# Patient Record
Sex: MALE | Race: WHITE | Employment: FULL TIME | URBAN - METROPOLITAN AREA
[De-identification: names, ages, dates, MRNs, and addresses within clinical notes are randomized per-mention and may not be internally consistent; named-entity substitution may affect disease eponyms.]

---

## 2017-04-13 ENCOUNTER — TELEPHONE (OUTPATIENT)
Dept: OTOLARYNGOLOGY | Facility: CLINIC | Age: 22
End: 2017-04-13

## 2017-04-13 ENCOUNTER — OFFICE VISIT (OUTPATIENT)
Dept: OTOLARYNGOLOGY | Facility: CLINIC | Age: 22
End: 2017-04-13

## 2017-04-13 VITALS
SYSTOLIC BLOOD PRESSURE: 124 MMHG | HEIGHT: 65 IN | DIASTOLIC BLOOD PRESSURE: 71 MMHG | TEMPERATURE: 98 F | BODY MASS INDEX: 51.82 KG/M2 | WEIGHT: 311 LBS

## 2017-04-13 DIAGNOSIS — H60.392 OTHER INFECTIVE ACUTE OTITIS EXTERNA OF LEFT EAR: Primary | ICD-10-CM

## 2017-04-13 PROCEDURE — 99212 OFFICE O/P EST SF 10 MIN: CPT | Performed by: OTOLARYNGOLOGY

## 2017-04-13 PROCEDURE — 92504 EAR MICROSCOPY EXAMINATION: CPT | Performed by: OTOLARYNGOLOGY

## 2017-04-13 PROCEDURE — 99214 OFFICE O/P EST MOD 30 MIN: CPT | Performed by: OTOLARYNGOLOGY

## 2017-04-13 RX ORDER — AMOXICILLIN 500 MG/1
1 TABLET, FILM COATED ORAL
COMMUNITY
Start: 2017-04-12 | End: 2017-04-22

## 2017-04-13 RX ORDER — NEOMYCIN SULFATE, POLYMYXIN B SULFATE, HYDROCORTISONE 3.5; 10000; 1 MG/ML; [USP'U]/ML; MG/ML
4 SOLUTION/ DROPS AURICULAR (OTIC)
COMMUNITY
Start: 2017-04-12 | End: 2021-03-29

## 2017-04-13 RX ORDER — CIPROFLOXACIN 500 MG/1
500 TABLET, FILM COATED ORAL EVERY 12 HOURS
Qty: 14 TABLET | Refills: 0 | Status: SHIPPED | OUTPATIENT
Start: 2017-04-13 | End: 2021-03-29

## 2017-04-13 RX ORDER — OMEPRAZOLE 40 MG/1
40 CAPSULE, DELAYED RELEASE ORAL DAILY
COMMUNITY
Start: 2012-11-26

## 2017-04-13 RX ORDER — OFLOXACIN 3 MG/ML
3 SOLUTION/ DROPS OPHTHALMIC 3 TIMES DAILY
Qty: 1 BOTTLE | Refills: 0 | Status: SHIPPED | OUTPATIENT
Start: 2017-04-13 | End: 2017-04-20

## 2017-04-13 NOTE — PROGRESS NOTES
Heather Colon is a 24year old male.   Patient presents with:  Ear Problem: pain and yellow brown drainage from the left ear since yesterday, pt is currently on antibiotics and ear drops       HISTORY OF PRESENT ILLNESS    He has had tubes at least LASER-ASSISTED           REVIEW OF SYSTEMS    System Neg/Pos Details   Constitutional Negative Fatigue, fever and weight loss. ENMT Negative Drooling. Eyes Negative Blurred vision and vision changes. Respiratory Negative Dyspnea and wheezing.    Eligio Trevino Normal. Oropharynx - Normal.   Nose/Mouth/Throat Normal Nares - Right: Normal Left: Normal. Septum -Normal  Turbinates - Right: Normal, Left: Normal.   Microscopy  Binocular microscopy was performed.  The affected ear(s) was/were examined and all debris rem

## 2017-04-13 NOTE — TELEPHONE ENCOUNTER
Pt requesting medication clarification on:  ofloxacin 0.3 % Ophthalmic Solution. Pt was seen in office today and was given ear drops but rx states that they are eye drops? Pt asking if it is ok to use drops in ears?  Also has questions re:   Cipro, if th

## 2017-04-13 NOTE — TELEPHONE ENCOUNTER
Pt informed he may use the Ofloxacin 0.3% ophthalmic sol'n in his ears; it is a secondary use of the medication. Regarding Ciprofloxacin, dizziness may be a side effect but is hard to say if he will experience it.   Advised pt to take medication with food;

## 2017-11-09 ENCOUNTER — OFFICE VISIT (OUTPATIENT)
Dept: OTOLARYNGOLOGY | Facility: CLINIC | Age: 22
End: 2017-11-09

## 2017-11-09 VITALS
SYSTOLIC BLOOD PRESSURE: 128 MMHG | DIASTOLIC BLOOD PRESSURE: 64 MMHG | BODY MASS INDEX: 53.78 KG/M2 | HEIGHT: 64 IN | WEIGHT: 315 LBS | TEMPERATURE: 98 F

## 2017-11-09 DIAGNOSIS — H61.23 BILATERAL IMPACTED CERUMEN: Primary | ICD-10-CM

## 2017-11-09 PROCEDURE — 69210 REMOVE IMPACTED EAR WAX UNI: CPT | Performed by: OTOLARYNGOLOGY

## 2020-04-21 ENCOUNTER — TELEPHONE (OUTPATIENT)
Dept: OTOLARYNGOLOGY | Facility: CLINIC | Age: 25
End: 2020-04-21

## 2020-04-21 NOTE — TELEPHONE ENCOUNTER
Teri Hemphill, pt last office visit on 11/09/17 ear wax , states he has tube in left ear. Pt states on saturday when showering, he felt water go in his left ear, and Sunday ear started draining (white/ green color drainage ), per pt no pain, no itching. OK to schedule e visit or should pt be seen in office on Friday, did get consent for e visit from pt.  Please advise

## 2020-04-21 NOTE — TELEPHONE ENCOUNTER
Per pt asking for cipro drops refill, states he has ear problem, states there is no fever but has shower water in ear where tube is. Please advise thank you.

## 2020-04-22 ENCOUNTER — TELEPHONE (OUTPATIENT)
Dept: OTOLARYNGOLOGY | Facility: CLINIC | Age: 25
End: 2020-04-22

## 2020-04-22 DIAGNOSIS — H92.12 OTORRHEA OF LEFT EAR: Primary | ICD-10-CM

## 2020-04-22 PROCEDURE — 99213 OFFICE O/P EST LOW 20 MIN: CPT | Performed by: OTOLARYNGOLOGY

## 2020-04-22 RX ORDER — OFLOXACIN 3 MG/ML
3 SOLUTION AURICULAR (OTIC) 3 TIMES DAILY
Qty: 1 BOTTLE | Refills: 0 | Status: SHIPPED | OUTPATIENT
Start: 2020-04-22 | End: 2020-04-29

## 2020-04-22 RX ORDER — CIPROFLOXACIN 500 MG/1
500 TABLET, FILM COATED ORAL EVERY 12 HOURS
Qty: 14 TABLET | Refills: 0 | Status: SHIPPED | OUTPATIENT
Start: 2020-04-22 | End: 2021-03-29

## 2020-04-22 NOTE — TELEPHONE ENCOUNTER
Virtual Telephone Check-In    Marc Addison verbally consents to a Virtual/Telephone Check-In visit on 04/22/20.     Patient understands and accepts financial responsibility for any deductible, co-insurance and/or co-pays associated with this servic left ear in a patient with a history of tubes only on the left side. I suspect otitis externa from water getting into his ear. We did discuss strict water precautions.   He does understand that there are limitations to this telephone encounter and that he

## 2021-03-30 ENCOUNTER — LAB ENCOUNTER (OUTPATIENT)
Dept: LAB | Facility: HOSPITAL | Age: 26
End: 2021-03-30
Attending: ORTHOPAEDIC SURGERY
Payer: OTHER MISCELLANEOUS

## 2021-03-30 DIAGNOSIS — Z01.818 PRE-OP TESTING: ICD-10-CM

## 2021-04-01 ENCOUNTER — HOSPITAL ENCOUNTER (OUTPATIENT)
Facility: HOSPITAL | Age: 26
Setting detail: HOSPITAL OUTPATIENT SURGERY
Discharge: HOME | End: 2021-04-01
Attending: ORTHOPAEDIC SURGERY | Admitting: ORTHOPAEDIC SURGERY
Payer: OTHER MISCELLANEOUS

## 2021-04-01 ENCOUNTER — ANESTHESIA (OUTPATIENT)
Dept: SURGERY | Facility: HOSPITAL | Age: 26
End: 2021-04-01
Payer: OTHER MISCELLANEOUS

## 2021-04-01 ENCOUNTER — ANESTHESIA EVENT (OUTPATIENT)
Dept: SURGERY | Facility: HOSPITAL | Age: 26
End: 2021-04-01
Payer: OTHER MISCELLANEOUS

## 2021-04-01 VITALS
HEART RATE: 77 BPM | SYSTOLIC BLOOD PRESSURE: 133 MMHG | TEMPERATURE: 98 F | RESPIRATION RATE: 16 BRPM | DIASTOLIC BLOOD PRESSURE: 85 MMHG | WEIGHT: 315 LBS | OXYGEN SATURATION: 94 % | BODY MASS INDEX: 52.48 KG/M2 | HEIGHT: 65 IN

## 2021-04-01 DIAGNOSIS — Z01.818 PRE-OP TESTING: Primary | ICD-10-CM

## 2021-04-01 PROCEDURE — 3E0T3BZ INTRODUCTION OF ANESTHETIC AGENT INTO PERIPHERAL NERVES AND PLEXI, PERCUTANEOUS APPROACH: ICD-10-PCS | Performed by: STUDENT IN AN ORGANIZED HEALTH CARE EDUCATION/TRAINING PROGRAM

## 2021-04-01 PROCEDURE — 01N40ZZ RELEASE ULNAR NERVE, OPEN APPROACH: ICD-10-PCS | Performed by: ORTHOPAEDIC SURGERY

## 2021-04-01 PROCEDURE — 3E0T33Z INTRODUCTION OF ANTI-INFLAMMATORY INTO PERIPHERAL NERVES AND PLEXI, PERCUTANEOUS APPROACH: ICD-10-PCS | Performed by: STUDENT IN AN ORGANIZED HEALTH CARE EDUCATION/TRAINING PROGRAM

## 2021-04-01 PROCEDURE — 76942 ECHO GUIDE FOR BIOPSY: CPT | Performed by: STUDENT IN AN ORGANIZED HEALTH CARE EDUCATION/TRAINING PROGRAM

## 2021-04-01 PROCEDURE — 64415 NJX AA&/STRD BRCH PLXS IMG: CPT | Performed by: ORTHOPAEDIC SURGERY

## 2021-04-01 PROCEDURE — 76942 ECHO GUIDE FOR BIOPSY: CPT | Performed by: ORTHOPAEDIC SURGERY

## 2021-04-01 RX ORDER — ACETAMINOPHEN 325 MG/1
650 TABLET ORAL EVERY 4 HOURS PRN
Status: CANCELLED | OUTPATIENT
Start: 2021-04-01

## 2021-04-01 RX ORDER — ONDANSETRON 2 MG/ML
4 INJECTION INTRAMUSCULAR; INTRAVENOUS EVERY 6 HOURS PRN
Status: CANCELLED | OUTPATIENT
Start: 2021-04-01

## 2021-04-01 RX ORDER — ROPIVACAINE HYDROCHLORIDE 5 MG/ML
INJECTION, SOLUTION EPIDURAL; INFILTRATION; PERINEURAL AS NEEDED
Status: DISCONTINUED | OUTPATIENT
Start: 2021-04-01 | End: 2021-04-01 | Stop reason: SURG

## 2021-04-01 RX ORDER — NALOXONE HYDROCHLORIDE 0.4 MG/ML
80 INJECTION, SOLUTION INTRAMUSCULAR; INTRAVENOUS; SUBCUTANEOUS AS NEEDED
Status: DISCONTINUED | OUTPATIENT
Start: 2021-04-01 | End: 2021-04-01

## 2021-04-01 RX ORDER — HALOPERIDOL 5 MG/ML
0.25 INJECTION INTRAMUSCULAR ONCE AS NEEDED
Status: CANCELLED | OUTPATIENT
Start: 2021-04-01 | End: 2021-04-01

## 2021-04-01 RX ORDER — HYDROMORPHONE HYDROCHLORIDE 1 MG/ML
0.4 INJECTION, SOLUTION INTRAMUSCULAR; INTRAVENOUS; SUBCUTANEOUS EVERY 5 MIN PRN
Status: CANCELLED | OUTPATIENT
Start: 2021-04-01

## 2021-04-01 RX ORDER — NALOXONE HYDROCHLORIDE 0.4 MG/ML
80 INJECTION, SOLUTION INTRAMUSCULAR; INTRAVENOUS; SUBCUTANEOUS AS NEEDED
Status: CANCELLED | OUTPATIENT
Start: 2021-04-01 | End: 2021-04-01

## 2021-04-01 RX ORDER — MORPHINE SULFATE 2 MG/ML
2 INJECTION, SOLUTION INTRAMUSCULAR; INTRAVENOUS EVERY 10 MIN PRN
Status: CANCELLED | OUTPATIENT
Start: 2021-04-01

## 2021-04-01 RX ORDER — PROCHLORPERAZINE EDISYLATE 5 MG/ML
5 INJECTION INTRAMUSCULAR; INTRAVENOUS ONCE AS NEEDED
Status: DISCONTINUED | OUTPATIENT
Start: 2021-04-01 | End: 2021-04-01

## 2021-04-01 RX ORDER — HYDROCODONE BITARTRATE AND ACETAMINOPHEN 5; 325 MG/1; MG/1
1 TABLET ORAL AS NEEDED
Status: CANCELLED | OUTPATIENT
Start: 2021-04-01

## 2021-04-01 RX ORDER — METOCLOPRAMIDE 10 MG/1
10 TABLET ORAL ONCE
Status: COMPLETED | OUTPATIENT
Start: 2021-04-01 | End: 2021-04-01

## 2021-04-01 RX ORDER — HYDROCODONE BITARTRATE AND ACETAMINOPHEN 5; 325 MG/1; MG/1
1 TABLET ORAL AS NEEDED
Status: DISCONTINUED | OUTPATIENT
Start: 2021-04-01 | End: 2021-04-01

## 2021-04-01 RX ORDER — MORPHINE SULFATE 4 MG/ML
4 INJECTION, SOLUTION INTRAMUSCULAR; INTRAVENOUS EVERY 10 MIN PRN
Status: CANCELLED | OUTPATIENT
Start: 2021-04-01

## 2021-04-01 RX ORDER — ONDANSETRON 2 MG/ML
4 INJECTION INTRAMUSCULAR; INTRAVENOUS ONCE AS NEEDED
Status: CANCELLED | OUTPATIENT
Start: 2021-04-01 | End: 2021-04-01

## 2021-04-01 RX ORDER — MIDAZOLAM HYDROCHLORIDE 1 MG/ML
INJECTION INTRAMUSCULAR; INTRAVENOUS AS NEEDED
Status: DISCONTINUED | OUTPATIENT
Start: 2021-04-01 | End: 2021-04-01 | Stop reason: SURG

## 2021-04-01 RX ORDER — SODIUM CHLORIDE, SODIUM LACTATE, POTASSIUM CHLORIDE, CALCIUM CHLORIDE 600; 310; 30; 20 MG/100ML; MG/100ML; MG/100ML; MG/100ML
INJECTION, SOLUTION INTRAVENOUS CONTINUOUS
Status: DISCONTINUED | OUTPATIENT
Start: 2021-04-01 | End: 2021-04-01

## 2021-04-01 RX ORDER — HYDROMORPHONE HYDROCHLORIDE 1 MG/ML
0.4 INJECTION, SOLUTION INTRAMUSCULAR; INTRAVENOUS; SUBCUTANEOUS EVERY 5 MIN PRN
Status: DISCONTINUED | OUTPATIENT
Start: 2021-04-01 | End: 2021-04-01

## 2021-04-01 RX ORDER — HYDROMORPHONE HYDROCHLORIDE 1 MG/ML
0.6 INJECTION, SOLUTION INTRAMUSCULAR; INTRAVENOUS; SUBCUTANEOUS EVERY 5 MIN PRN
Status: DISCONTINUED | OUTPATIENT
Start: 2021-04-01 | End: 2021-04-01

## 2021-04-01 RX ORDER — HALOPERIDOL 5 MG/ML
0.25 INJECTION INTRAMUSCULAR ONCE AS NEEDED
Status: DISCONTINUED | OUTPATIENT
Start: 2021-04-01 | End: 2021-04-01

## 2021-04-01 RX ORDER — MORPHINE SULFATE 10 MG/ML
6 INJECTION, SOLUTION INTRAMUSCULAR; INTRAVENOUS EVERY 10 MIN PRN
Status: CANCELLED | OUTPATIENT
Start: 2021-04-01

## 2021-04-01 RX ORDER — HYDROCODONE BITARTRATE AND ACETAMINOPHEN 5; 325 MG/1; MG/1
2 TABLET ORAL EVERY 4 HOURS PRN
Status: CANCELLED | OUTPATIENT
Start: 2021-04-01

## 2021-04-01 RX ORDER — ONDANSETRON 2 MG/ML
4 INJECTION INTRAMUSCULAR; INTRAVENOUS ONCE AS NEEDED
Status: DISCONTINUED | OUTPATIENT
Start: 2021-04-01 | End: 2021-04-01

## 2021-04-01 RX ORDER — MORPHINE SULFATE 10 MG/ML
6 INJECTION, SOLUTION INTRAMUSCULAR; INTRAVENOUS EVERY 10 MIN PRN
Status: DISCONTINUED | OUTPATIENT
Start: 2021-04-01 | End: 2021-04-01

## 2021-04-01 RX ORDER — MORPHINE SULFATE 4 MG/ML
2 INJECTION, SOLUTION INTRAMUSCULAR; INTRAVENOUS EVERY 10 MIN PRN
Status: DISCONTINUED | OUTPATIENT
Start: 2021-04-01 | End: 2021-04-01

## 2021-04-01 RX ORDER — FAMOTIDINE 20 MG/1
20 TABLET ORAL ONCE
Status: COMPLETED | OUTPATIENT
Start: 2021-04-01 | End: 2021-04-01

## 2021-04-01 RX ORDER — HYDROMORPHONE HYDROCHLORIDE 1 MG/ML
0.6 INJECTION, SOLUTION INTRAMUSCULAR; INTRAVENOUS; SUBCUTANEOUS EVERY 5 MIN PRN
Status: CANCELLED | OUTPATIENT
Start: 2021-04-01

## 2021-04-01 RX ORDER — HYDROCODONE BITARTRATE AND ACETAMINOPHEN 5; 325 MG/1; MG/1
2 TABLET ORAL AS NEEDED
Status: CANCELLED | OUTPATIENT
Start: 2021-04-01

## 2021-04-01 RX ORDER — SODIUM CHLORIDE, SODIUM LACTATE, POTASSIUM CHLORIDE, CALCIUM CHLORIDE 600; 310; 30; 20 MG/100ML; MG/100ML; MG/100ML; MG/100ML
INJECTION, SOLUTION INTRAVENOUS CONTINUOUS
Status: CANCELLED | OUTPATIENT
Start: 2021-04-01

## 2021-04-01 RX ORDER — MORPHINE SULFATE 4 MG/ML
4 INJECTION, SOLUTION INTRAMUSCULAR; INTRAVENOUS EVERY 10 MIN PRN
Status: DISCONTINUED | OUTPATIENT
Start: 2021-04-01 | End: 2021-04-01

## 2021-04-01 RX ORDER — PROCHLORPERAZINE EDISYLATE 5 MG/ML
5 INJECTION INTRAMUSCULAR; INTRAVENOUS ONCE AS NEEDED
Status: CANCELLED | OUTPATIENT
Start: 2021-04-01 | End: 2021-04-01

## 2021-04-01 RX ORDER — ACETAMINOPHEN 500 MG
1000 TABLET ORAL ONCE
Status: COMPLETED | OUTPATIENT
Start: 2021-04-01 | End: 2021-04-01

## 2021-04-01 RX ORDER — HYDROMORPHONE HYDROCHLORIDE 1 MG/ML
0.2 INJECTION, SOLUTION INTRAMUSCULAR; INTRAVENOUS; SUBCUTANEOUS EVERY 5 MIN PRN
Status: CANCELLED | OUTPATIENT
Start: 2021-04-01

## 2021-04-01 RX ORDER — HYDROMORPHONE HYDROCHLORIDE 1 MG/ML
0.2 INJECTION, SOLUTION INTRAMUSCULAR; INTRAVENOUS; SUBCUTANEOUS EVERY 5 MIN PRN
Status: DISCONTINUED | OUTPATIENT
Start: 2021-04-01 | End: 2021-04-01

## 2021-04-01 RX ORDER — HYDROCODONE BITARTRATE AND ACETAMINOPHEN 5; 325 MG/1; MG/1
2 TABLET ORAL AS NEEDED
Status: DISCONTINUED | OUTPATIENT
Start: 2021-04-01 | End: 2021-04-01

## 2021-04-01 RX ORDER — DEXAMETHASONE SODIUM PHOSPHATE 10 MG/ML
INJECTION, SOLUTION INTRAMUSCULAR; INTRAVENOUS AS NEEDED
Status: DISCONTINUED | OUTPATIENT
Start: 2021-04-01 | End: 2021-04-01 | Stop reason: SURG

## 2021-04-01 RX ORDER — HYDROCODONE BITARTRATE AND ACETAMINOPHEN 5; 325 MG/1; MG/1
1 TABLET ORAL EVERY 4 HOURS PRN
Status: CANCELLED | OUTPATIENT
Start: 2021-04-01

## 2021-04-01 RX ADMIN — MIDAZOLAM HYDROCHLORIDE 2 MG: 1 INJECTION INTRAMUSCULAR; INTRAVENOUS at 07:20:00

## 2021-04-01 RX ADMIN — SODIUM CHLORIDE, SODIUM LACTATE, POTASSIUM CHLORIDE, CALCIUM CHLORIDE: 600; 310; 30; 20 INJECTION, SOLUTION INTRAVENOUS at 07:48:00

## 2021-04-01 RX ADMIN — SODIUM CHLORIDE, SODIUM LACTATE, POTASSIUM CHLORIDE, CALCIUM CHLORIDE: 600; 310; 30; 20 INJECTION, SOLUTION INTRAVENOUS at 08:20:00

## 2021-04-01 RX ADMIN — ROPIVACAINE HYDROCHLORIDE 30 ML: 5 INJECTION, SOLUTION EPIDURAL; INFILTRATION; PERINEURAL at 07:28:00

## 2021-04-01 RX ADMIN — DEXAMETHASONE SODIUM PHOSPHATE 4 MG: 10 INJECTION, SOLUTION INTRAMUSCULAR; INTRAVENOUS at 07:28:00

## 2021-04-01 NOTE — ANESTHESIA POSTPROCEDURE EVALUATION
Patient: Jessenia Handy.     Procedure Summary     Date: 04/01/21 Room / Location: [address] [place] [address] MAIN OR 16 / [address] [place] [address] MAIN OR    Anesthesia Start: 0079 Anesthesia Stop: 2309    Procedure: right cubital tunnel release (Right Elbow) Diagnosis: (right elbow ulna

## 2021-04-01 NOTE — BRIEF OP NOTE
Pre-Operative Diagnosis: right elbow ulnar nerve compression     Post-Operative Diagnosis: right elbow ulnar nerve compression      Procedure Performed:   right cubital tunnel release    Surgeon(s) and Role:     Billy Puga MD - Primary    Assistant

## 2021-04-01 NOTE — H&P
[facility]    History and Physical    Brady Briceño.  Patient Status:  Hospital Outpatient Surgery    1995 MRN [id number]   Location [address] [facility] Attending Jackie Singleton MD   Hosp Day # 0 PCP Donnie Brambila rate.    Pulmonary/Chest: Effort normal.         Results:   No results found for: WBC, HGB, HCT, PLT, CREATSERUM, BUN, NA, K, CL, CO2, GLU, CA, ALB, ALKPHO, BILT, TP, AST, ALT, PTT, INR, PT, T4F, TSH, TSHREFLEX, XAVI, LIP, GGT, PSA, DDIMER, ESRML, ESRPF, CR

## 2021-04-01 NOTE — ANESTHESIA PREPROCEDURE EVALUATION
Anesthesia PreOp Note    HPI:     Subha Dose. is a 31year old male who presents for preoperative consultation requested by: Blossom Lau MD    Date of Surgery: 4/1/2021    Procedure(s):  right cubital tunnel release  Indication: right tobacco: Never Used      Tobacco comment: quit over a year ago     Vaping Use      Vaping Use: Never used    Substance and Sexual Activity      Alcohol use: Not Currently      Drug use: No      Sexual activity: Not on file    Other Topics      Concerns: saturation is 97%.    03/29/21  1616 04/01/21  0616   BP:  128/79   Pulse:  80   Resp:  18   Temp:  98.1 °F (36.7 °C)   TempSrc:  Oral   SpO2:  97%   Weight: (!) 152.9 kg (337 lb) (!) 154.7 kg (341 lb)   Height: 1.651 m (5' 5\") 1.651 m (5' 5\")        Ane

## 2021-04-01 NOTE — ANESTHESIA PROCEDURE NOTES
Peripheral Block  Performed by: No Hay MD  Authorized by: No Hay MD       General Information and Staff    Start Time:  4/1/2021 7:20 AM  End Time:  4/1/2021 7:28 AM  Anesthesiologist:  No Hay MD  Performed by:   Anesthesiologist

## 2021-04-14 NOTE — OPERATIVE REPORT
Pre-Operative Diagnosis: right elbow ulnar nerve compression     Post-Operative Diagnosis: right elbow ulnar nerve compression      Procedure Performed:   right cubital tunnel release     Surgeon(s) and Role:     Shani Ruth MD - Primary     Assista of the elbow centered on just posterior of epicondyle, taken through skin and subcutaneous tissue with cauterization of all crossing vessels.  Blunt dissection was utilized to identify the sensory nerve branches, which were dissected, retracted, and protect

## 2021-06-15 ENCOUNTER — OFFICE VISIT (OUTPATIENT)
Dept: OTOLARYNGOLOGY | Facility: CLINIC | Age: 26
End: 2021-06-15
Payer: COMMERCIAL

## 2021-06-15 VITALS
WEIGHT: 315 LBS | DIASTOLIC BLOOD PRESSURE: 76 MMHG | SYSTOLIC BLOOD PRESSURE: 146 MMHG | HEIGHT: 64 IN | TEMPERATURE: 99 F | BODY MASS INDEX: 53.78 KG/M2

## 2021-06-15 DIAGNOSIS — H60.392 OTHER INFECTIVE ACUTE OTITIS EXTERNA OF LEFT EAR: Primary | ICD-10-CM

## 2021-06-15 PROCEDURE — 99203 OFFICE O/P NEW LOW 30 MIN: CPT | Performed by: OTOLARYNGOLOGY

## 2021-06-15 PROCEDURE — 3008F BODY MASS INDEX DOCD: CPT | Performed by: OTOLARYNGOLOGY

## 2021-06-15 PROCEDURE — 3077F SYST BP >= 140 MM HG: CPT | Performed by: OTOLARYNGOLOGY

## 2021-06-15 PROCEDURE — 92504 EAR MICROSCOPY EXAMINATION: CPT | Performed by: OTOLARYNGOLOGY

## 2021-06-15 PROCEDURE — 3078F DIAST BP <80 MM HG: CPT | Performed by: OTOLARYNGOLOGY

## 2021-06-15 RX ORDER — IBUPROFEN 200 MG
600 TABLET ORAL EVERY 6 HOURS PRN
COMMUNITY

## 2021-06-15 RX ORDER — OFLOXACIN 3 MG/ML
3 SOLUTION AURICULAR (OTIC) 3 TIMES DAILY
Qty: 10 ML | Refills: 0 | Status: SHIPPED | OUTPATIENT
Start: 2021-06-15 | End: 2021-12-02

## 2021-06-15 RX ORDER — HYDROCODONE BITARTRATE AND ACETAMINOPHEN 5; 325 MG/1; MG/1
TABLET ORAL
COMMUNITY
Start: 2021-04-01 | End: 2021-07-28

## 2021-06-15 NOTE — PROGRESS NOTES
Alina Ingram. is a 31year old male. Patient presents with:  Ear Problem: left ear infection for a couple of weeks       HISTORY OF PRESENT ILLNESS  Previous tube placement about 8 years ago. Only on the left side.   Doing well up until recently Negative Drooling. Eyes Negative Blurred vision and vision changes. Respiratory Negative Dyspnea and wheezing. Cardio Negative Chest pain, irregular heartbeat/palpitations and syncope. GI Negative Abdominal pain and diarrhea.    Endocrine Negative C -Normal  Turbinates - Right: Normal, Left: Normal.   Microscopy  Binocular microscopy was performed. The affected ear(s) was/were examined and all debris removed using suction.  The findings are described in the physical exam.   Left ear cleaned of all debr

## 2021-06-18 ENCOUNTER — TELEPHONE (OUTPATIENT)
Dept: OTOLARYNGOLOGY | Facility: CLINIC | Age: 26
End: 2021-06-18

## 2021-06-18 NOTE — TELEPHONE ENCOUNTER
Mother of pt calling states sent medication to wrong pharmacy need to be sent [facility] on [address]  [address] , [facility] [phone] please advise       ofloxacin 0.3 % Otic Solution 10 mL 0 6/15/2021    Sig:   Place 3 drops into t

## 2021-07-28 ENCOUNTER — OFFICE VISIT (OUTPATIENT)
Dept: NEUROLOGY | Facility: CLINIC | Age: 26
End: 2021-07-28
Payer: COMMERCIAL

## 2021-07-28 ENCOUNTER — TELEPHONE (OUTPATIENT)
Dept: NEUROLOGY | Facility: CLINIC | Age: 26
End: 2021-07-28

## 2021-07-28 VITALS
HEART RATE: 80 BPM | SYSTOLIC BLOOD PRESSURE: 142 MMHG | DIASTOLIC BLOOD PRESSURE: 78 MMHG | HEIGHT: 64 IN | WEIGHT: 315 LBS | BODY MASS INDEX: 53.78 KG/M2

## 2021-07-28 DIAGNOSIS — H02.401 PTOSIS OF RIGHT EYELID: ICD-10-CM

## 2021-07-28 DIAGNOSIS — M79.601 RIGHT ARM PAIN: Primary | ICD-10-CM

## 2021-07-28 DIAGNOSIS — R51.9 OCCIPITAL HEADACHE: ICD-10-CM

## 2021-07-28 DIAGNOSIS — M54.2 CERVICALGIA: ICD-10-CM

## 2021-07-28 PROCEDURE — 3078F DIAST BP <80 MM HG: CPT | Performed by: OTHER

## 2021-07-28 PROCEDURE — 99204 OFFICE O/P NEW MOD 45 MIN: CPT | Performed by: OTHER

## 2021-07-28 PROCEDURE — 3077F SYST BP >= 140 MM HG: CPT | Performed by: OTHER

## 2021-07-28 PROCEDURE — 3008F BODY MASS INDEX DOCD: CPT | Performed by: OTHER

## 2021-07-28 RX ORDER — TIZANIDINE 2 MG/1
1 TABLET ORAL NIGHTLY
Qty: 15 TABLET | Refills: 0 | Status: SHIPPED | OUTPATIENT
Start: 2021-07-28 | End: 2021-08-27

## 2021-07-28 RX ORDER — ACETAMINOPHEN 325 MG/1
325 TABLET ORAL EVERY 6 HOURS PRN
COMMUNITY

## 2021-07-28 NOTE — PATIENT INSTRUCTIONS
-MRI brain, carotids and cervical spine  -Trial of Tizanidine 1 mg at bedtime   -Referral to physical medicine & rehabilitation   -Follow up in 1 month or sooner if needed.     -If you develop sudden onset loss of vision, double vision, room spinning/world

## 2021-07-28 NOTE — TELEPHONE ENCOUNTER
MRA CAROTIDS (W+WO) (CPT=70549)-pending   MRI SPINE CERVICAL (CPT=72141)-pending   MRI BRAIN (W+WO) (CPT=70553)-pending     Called patient to verify coverage for requests.   Patient will call back with a determination whether to initiate authorization with

## 2021-07-28 NOTE — PROGRESS NOTES
[facility] [address]  [address]  [phone]              Date: July 28, 2021  Patient Name: Jessika Boyer.    MRN: [id number]    Reason for Evaluation: Headache     HPI:     Jessika Boyer had word-finding difficulty and comprehension difficulties. Started with his current symptoms. No syncope. Rare positional lightheadedness. He is not back at work yet. He was released for 25-lb duty but there has not been a role for him yet.  He is PHYSICAL EXAM:     /78 (BP Location: Left arm, Patient Position: Sitting, Cuff Size: adult)   Pulse 80   Ht 64\"   Wt (!) 342 lb (155.1 kg)   BMI 58.70 kg/m²   General appearance: Well appearing, alert and in no acute distress  Skin: skin color n A 2  2  Plantar Flexor  Flexor     Coordination:   Normal: Finger to nose: no ataxia or dysmetria      Heel-to-shin: no ataxia or dysmetria     Gait:   Arises independently; normal posture; gait stable with normal stride length, rate, base and arm swing. trauma    –MRI brain with and without, MRA carotids with and without   –MRI cervical without  –Referral to physical medicine & rehabilitation for right hand trigger finger and persisting right arm pain, ? RSD   –Trial of very low-dose tizanidine to help wit

## 2021-07-28 NOTE — TELEPHONE ENCOUNTER
MRA CAROTIDS (W+WO) (CPT=70549)-APPROVED    MRI BRAIN (W+WO) (CPT=70553)-APPROVED      MRI SPINE CERVICAL (CPT=72141)-APPROVED       Incoming vm from patient requesting a return call. Called patient who states to initiate authorization with [employer].  Akua

## 2021-08-12 ENCOUNTER — OFFICE VISIT (OUTPATIENT)
Dept: NEUROLOGY | Facility: CLINIC | Age: 26
End: 2021-08-12
Payer: COMMERCIAL

## 2021-08-12 VITALS — BODY MASS INDEX: 53.78 KG/M2 | HEIGHT: 64 IN | RESPIRATION RATE: 20 BRPM | WEIGHT: 315 LBS

## 2021-08-12 DIAGNOSIS — M54.12 RADICULITIS OF RIGHT CERVICAL REGION: Primary | ICD-10-CM

## 2021-08-12 PROCEDURE — 3008F BODY MASS INDEX DOCD: CPT | Performed by: PHYSICAL MEDICINE & REHABILITATION

## 2021-08-12 PROCEDURE — 99244 OFF/OP CNSLTJ NEW/EST MOD 40: CPT | Performed by: PHYSICAL MEDICINE & REHABILITATION

## 2021-08-12 RX ORDER — METHYLPREDNISOLONE 4 MG/1
TABLET ORAL
Qty: 1 EACH | Refills: 0 | Status: SHIPPED | OUTPATIENT
Start: 2021-08-12 | End: 2021-08-18

## 2021-08-12 NOTE — H&P
[place] [address]    History and Physical    Rachana Tejada.  Patient Status:  No patient class for patient encounter    1995 MRN [id number]   Location [place] [address] Attending No att. providers found   Hosp D worsen when he attempts to grasp objects with his right hand. He has had no previous cervical spine disorders in the past.    He also has other symptoms.   He states that when he awakens he feels as though the right side of his face is numb, and he has pos CBD    Allergies/Medications:    Allergies: No Known Allergies    Review of Systems:   Patient-reported ROS  Constitutional  Sleep Disturbance: admits  Chills: denies  Fever: denies  Weight Gain: denies  Weight Loss: denies   Cardiovascular  Chest Pain: den Spurling test to the right + for right sided neck and shoulder pain. Neurological:   Strength testing: normal muscle bulk in the bilateral upper extremities.  Slight weakness in right shoulder abduction, elbow flexion, wrist extension, 2nd and 3rd finger

## 2021-08-16 ENCOUNTER — HOSPITAL ENCOUNTER (OUTPATIENT)
Dept: MRI IMAGING | Facility: HOSPITAL | Age: 26
Discharge: HOME | End: 2021-08-16
Attending: Other
Payer: COMMERCIAL

## 2021-08-16 ENCOUNTER — PATIENT MESSAGE (OUTPATIENT)
Dept: NEUROLOGY | Facility: CLINIC | Age: 26
End: 2021-08-16

## 2021-08-16 ENCOUNTER — APPOINTMENT (OUTPATIENT)
Dept: MRI IMAGING | Age: 26
End: 2021-08-16
Attending: Other
Payer: COMMERCIAL

## 2021-08-16 ENCOUNTER — HOSPITAL ENCOUNTER (OUTPATIENT)
Dept: MRI IMAGING | Age: 26
End: 2021-08-16
Attending: Other
Payer: COMMERCIAL

## 2021-08-16 DIAGNOSIS — M54.2 CERVICALGIA: ICD-10-CM

## 2021-08-16 DIAGNOSIS — H02.401 PTOSIS OF RIGHT EYELID: ICD-10-CM

## 2021-08-16 DIAGNOSIS — R51.9 OCCIPITAL HEADACHE: ICD-10-CM

## 2021-08-16 PROCEDURE — 70553 MRI BRAIN STEM W/O & W/DYE: CPT | Performed by: OTHER

## 2021-08-16 PROCEDURE — 72141 MRI NECK SPINE W/O DYE: CPT | Performed by: OTHER

## 2021-08-16 PROCEDURE — 70549 MR ANGIOGRAPH NECK W/O&W/DYE: CPT | Performed by: OTHER

## 2021-08-16 PROCEDURE — A9575 INJ GADOTERATE MEGLUMI 0.1ML: HCPCS | Performed by: OTHER

## 2021-08-16 NOTE — TELEPHONE ENCOUNTER
From: Stella Mendoza. To: Farhat Cano DO  Sent: 8/16/2021 10:23 AM CDT  Subject: Test Results Question    Hi Dr. Rhoda Medrano told me to message you on my chart once my 3 MRI's were completed.  They are all completed and I was informed that t

## 2021-08-18 ENCOUNTER — OFFICE VISIT (OUTPATIENT)
Dept: NEUROLOGY | Facility: CLINIC | Age: 26
End: 2021-08-18
Payer: COMMERCIAL

## 2021-08-18 ENCOUNTER — TELEPHONE (OUTPATIENT)
Dept: NEUROLOGY | Facility: CLINIC | Age: 26
End: 2021-08-18

## 2021-08-18 VITALS
SYSTOLIC BLOOD PRESSURE: 110 MMHG | HEART RATE: 108 BPM | BODY MASS INDEX: 52.48 KG/M2 | OXYGEN SATURATION: 98 % | HEIGHT: 65 IN | WEIGHT: 315 LBS | DIASTOLIC BLOOD PRESSURE: 84 MMHG

## 2021-08-18 VITALS
BODY MASS INDEX: 53.78 KG/M2 | HEART RATE: 96 BPM | DIASTOLIC BLOOD PRESSURE: 84 MMHG | WEIGHT: 315 LBS | SYSTOLIC BLOOD PRESSURE: 110 MMHG | HEIGHT: 64 IN

## 2021-08-18 DIAGNOSIS — M79.18 CERVICAL MYOFASCIAL PAIN SYNDROME: Primary | ICD-10-CM

## 2021-08-18 DIAGNOSIS — G43.019 INTRACTABLE MIGRAINE WITHOUT AURA AND WITHOUT STATUS MIGRAINOSUS: Primary | ICD-10-CM

## 2021-08-18 DIAGNOSIS — H02.401 PTOSIS OF RIGHT EYELID: ICD-10-CM

## 2021-08-18 DIAGNOSIS — R51.9 OCCIPITAL HEADACHE: ICD-10-CM

## 2021-08-18 PROCEDURE — 3008F BODY MASS INDEX DOCD: CPT | Performed by: OTHER

## 2021-08-18 PROCEDURE — 3074F SYST BP LT 130 MM HG: CPT | Performed by: PHYSICAL MEDICINE & REHABILITATION

## 2021-08-18 PROCEDURE — 99213 OFFICE O/P EST LOW 20 MIN: CPT | Performed by: OTHER

## 2021-08-18 PROCEDURE — 3008F BODY MASS INDEX DOCD: CPT | Performed by: PHYSICAL MEDICINE & REHABILITATION

## 2021-08-18 PROCEDURE — 3074F SYST BP LT 130 MM HG: CPT | Performed by: OTHER

## 2021-08-18 PROCEDURE — 99214 OFFICE O/P EST MOD 30 MIN: CPT | Performed by: PHYSICAL MEDICINE & REHABILITATION

## 2021-08-18 PROCEDURE — 3079F DIAST BP 80-89 MM HG: CPT | Performed by: PHYSICAL MEDICINE & REHABILITATION

## 2021-08-18 PROCEDURE — 3079F DIAST BP 80-89 MM HG: CPT | Performed by: OTHER

## 2021-08-18 RX ORDER — DULOXETIN HYDROCHLORIDE 30 MG/1
CAPSULE, DELAYED RELEASE ORAL
Qty: 67 CAPSULE | Refills: 0 | Status: SHIPPED | OUTPATIENT
Start: 2021-08-18 | End: 2021-10-18

## 2021-08-18 NOTE — TELEPHONE ENCOUNTER
Quite mild/minimal disc bulge at one of the mid-levels of the neck; follow up for re-evaluation. Not entirely convinced that this is causing his symptoms in the right arm.  In the meantime agree with neurologist Dr. Pradhan Sic, might benefit from duloxetine and w

## 2021-08-18 NOTE — PROGRESS NOTES
[facility]  [address]  [phone]          Established  Follow Up Visit       Date: August 18, 2021  Patient Name: Alina Ingram.    MRN: [id number]  Primary physician: Dylan Lubin 40 mg by mouth daily. , Disp: , Rfl:     No current facility-administered medications on file prior to visit.         PHYSICAL EXAM:     /84 (BP Location: Right arm, Patient Position: Sitting, Cuff Size: adult)   Pulse 96   Ht 64\"   Wt (!) 342 lb (1 is having poor sleep with headaches occur on awakening that are sharp in his occipital region with throbbing in his vertex and there is provocation with Valsalva maneuver. There has been associated photophobia.   Other associated symptoms are word finding results as well as further testing and medications required. This note was prepared using [employer] voice recognition dictation software and as a result, errors may occur. When identified, these errors have been corrected.  While every attempt is ma

## 2021-08-18 NOTE — PATIENT INSTRUCTIONS
Recommend Voltaren gel over the right thumb and wrist as we discussed. Apply twice a day for the next 2 weeks. Might help to use a thumb spica splint during the daytime as well. Start duloxetine as recommended by Dr. Pradhan Sic.

## 2021-08-18 NOTE — PROGRESS NOTES
Progress note    C/C: right arm pain    HPI: 48-year-old male presents for follow-up. Underwent MRI of the cervical spine, returns to discuss results. Also an MRI of the brain and MRA of the carotids.   Saw neurologist Dr. Alex Ospina earlier today who is gurinderi stabilization, Voltaren gel over the radial aspect of the wrist and distal forearm. Consider thumb spica splint. Hold off on EMG of the right upper extremity. Encouraged patient to take duloxetine, may help with myofascial symptoms. F/u after PT.     He

## 2021-08-18 NOTE — PATIENT INSTRUCTIONS
-MRA brain   -Cymbalta   -Follow up in 3 months or sooner if needed.     -If you develop sudden onset loss of vision, double vision, room spinning/world spinning sensation, inability to speak or understand others who are speaking to you, slurred speech, bal hydration, unless instructed to restrict fluid intake. Avoid alcohol use. If you have diabetes, monitor blood sugar levels closely. May cause increase in blood sugar levels. Can cause drowsiness, dizziness, fatigue, or insomnia.  You may experience headache it is not working, please let your health care provider know so that they may advise you on how to stop the medication safely. NOTE: Do not use during pregnancy, unless directed by your physician. DOSAGE: To be determined by your physician.

## 2021-08-18 NOTE — TELEPHONE ENCOUNTER
[employer] for authorization of approval for MRA brain wo cpt code 15701. Authorization # [id number] effective 08/18/2021 - 09/16/2021. Pt. Informed. Transferred call to scheduling for appt.

## 2021-08-19 ENCOUNTER — PATIENT MESSAGE (OUTPATIENT)
Dept: NEUROLOGY | Facility: CLINIC | Age: 26
End: 2021-08-19

## 2021-08-19 ENCOUNTER — HOSPITAL ENCOUNTER (OUTPATIENT)
Dept: MRI IMAGING | Facility: HOSPITAL | Age: 26
Discharge: HOME | End: 2021-08-19
Attending: Other
Payer: COMMERCIAL

## 2021-08-19 DIAGNOSIS — R51.9 OCCIPITAL HEADACHE: ICD-10-CM

## 2021-08-19 DIAGNOSIS — H02.401 PTOSIS OF RIGHT EYELID: ICD-10-CM

## 2021-08-19 PROCEDURE — 70544 MR ANGIOGRAPHY HEAD W/O DYE: CPT | Performed by: OTHER

## 2021-08-19 NOTE — TELEPHONE ENCOUNTER
From: Dunia Marquez. To: Sharda Abreu DO  Sent: 8/19/2021 11:04 AM CDT  Subject: Test Results Question    Hi Dr. Eddie Donis,  That is great news. Can you recommend a ophthalmologist for me? Thank You!

## 2021-08-26 ENCOUNTER — OFFICE VISIT (OUTPATIENT)
Dept: OPHTHALMOLOGY | Facility: CLINIC | Age: 26
End: 2021-08-26
Payer: COMMERCIAL

## 2021-08-26 DIAGNOSIS — H52.13 MYOPIA OF BOTH EYES WITH REGULAR ASTIGMATISM: ICD-10-CM

## 2021-08-26 DIAGNOSIS — H02.401 PTOSIS OF RIGHT UPPER EYELID: Primary | ICD-10-CM

## 2021-08-26 DIAGNOSIS — H52.223 MYOPIA OF BOTH EYES WITH REGULAR ASTIGMATISM: ICD-10-CM

## 2021-08-26 PROCEDURE — 92004 COMPRE OPH EXAM NEW PT 1/>: CPT | Performed by: OPHTHALMOLOGY

## 2021-08-26 NOTE — ASSESSMENT & PLAN NOTE
Patient  has noticed right eyelid ptosis for the past 2 months. Symptom of ptosis is worse in the morning and at the end of the day. He is under the care of neurologist- Dr. Shanice Galloway. Refer to Dr. Lopez Hartmann for evaluation and treatment.      Patient ha

## 2021-08-26 NOTE — PROGRESS NOTES
Gabbie Telles. is a 31year old male. HPI:     HPI     Consult      Additional comments: Dr. Spencer Harrington     NP/ 31 yr old here for an evaluation of RUL ptosis for the past 2 months.   It is worse first thing in the morning and tow on 8/26/2021  5:16 PM. (History)        Patient History:  Past Medical History:   Diagnosis Date   • History of stomach ulcers    • Myopia of both eyes with regular astigmatism 8/26/2021   • Obesity    • Ptosis of right upper eyelid 8/26/2021   • Visual im Omeprazole 40 MG Oral Capsule Delayed Release Take 40 mg by mouth daily.            Allergies:  No Known Allergies    ROS:     ROS     Positive for: Neurological, Eyes    Negative for: Constitutional, Gastrointestinal, Skin, Genitourinary, Musculoskeletal, Normal            Refraction     Wearing Rx       Sphere Cylinder Axis    Right -2.25 +0.25 70    Left -2.75 +1.00 120    Type: Single vision                 ASSESSMENT/PLAN:     Diagnoses and Plan:     Ptosis of right upper eyelid  Patient  has noticed ri

## 2021-08-26 NOTE — PATIENT INSTRUCTIONS
Ptosis of right upper eyelid  Patient  has noticed right eyelid ptosis for the past 2 months. Symptom of ptosis is worse in the morning and at the end of the day. He is under the care of neurologist- Dr. Josh Tinoco.  Refer to Dr. Miguel A Abreu for evaluation

## 2021-08-31 ENCOUNTER — TELEPHONE (OUTPATIENT)
Dept: PHYSICAL THERAPY | Facility: HOSPITAL | Age: 26
End: 2021-08-31

## 2021-09-07 ENCOUNTER — TELEPHONE (OUTPATIENT)
Dept: PHYSICAL THERAPY | Facility: HOSPITAL | Age: 26
End: 2021-09-07

## 2021-09-08 ENCOUNTER — OFFICE VISIT (OUTPATIENT)
Dept: PHYSICAL THERAPY | Facility: HOSPITAL | Age: 26
End: 2021-09-08
Attending: PHYSICAL MEDICINE & REHABILITATION
Payer: COMMERCIAL

## 2021-09-08 PROCEDURE — 97110 THERAPEUTIC EXERCISES: CPT

## 2021-09-08 PROCEDURE — 97162 PT EVAL MOD COMPLEX 30 MIN: CPT

## 2021-09-08 NOTE — PROGRESS NOTES
CERVICAL SPINE EVALUATION:   Referring Physician: Dr. Travon Pizano  Diagnosis: Cervical myofascial pain syndrome (M79.18)  Date of Service: 9/8/2021     PATIENT SUMMARY   Waqar Kee is a 31year old y/o male who presents to therapy today with co Past Surgical History:   Procedure Laterality Date   • ADENOIDECTOMY     • COLONOSCOPY     • MYRINGOTOMY, LASER-ASSISTED     • OTHER Right 10/26/2020    right forearm compound fracture   • OTHER SURGICAL HISTORY      Myringotomy and tube placement   • physical therapy interventions. Heide Angel. demonstrates a complex pain presentation with multiple likely causes of his arm/neck pain.  Pt will initially focus on Jennie based interventions due to improvement in his pain presentation with r Today’s Treatment and Response:   Pt education was provided on exam findings, treatment diagnosis, treatment plan, expectations, and prognosis.  Pt was also provided recommendations for pt/ot/slp education: activity modifications, possible soreness aft Please co-sign or sign and return this letter via fax as soon as possible to [phone].  If you have any questions, please contact me at Dept: [phone]    Sincerely,  Electronically signed by therapist: Anand Gallagher, PT, DPT, Windy Brown

## 2021-09-10 ENCOUNTER — OFFICE VISIT (OUTPATIENT)
Dept: PHYSICAL THERAPY | Facility: HOSPITAL | Age: 26
End: 2021-09-10
Attending: PHYSICAL MEDICINE & REHABILITATION
Payer: COMMERCIAL

## 2021-09-10 DIAGNOSIS — M79.18 CERVICAL MYOFASCIAL PAIN SYNDROME: ICD-10-CM

## 2021-09-10 PROCEDURE — 97110 THERAPEUTIC EXERCISES: CPT

## 2021-09-10 NOTE — PROGRESS NOTES
Dx: Cervical myofascial pain syndrome (M79.18)          Authorized # of Visits:  2/12 (approved to 8)         Next MD visit: Dr. Curtis Fregoso 9/16  Fall Risk: Standard      Precautions: N/A         Subjective:  Pt reports no change in hand symptoms since last s continue with POC with focus on improving cervical position and cervical stabiltiy.     Charges: Tien 3   Total Timed Treatment: 45 min     Total Treatment Time: 45 min

## 2021-09-15 ENCOUNTER — OFFICE VISIT (OUTPATIENT)
Dept: PHYSICAL THERAPY | Facility: HOSPITAL | Age: 26
End: 2021-09-15
Attending: PHYSICAL MEDICINE & REHABILITATION
Payer: COMMERCIAL

## 2021-09-15 PROCEDURE — 97140 MANUAL THERAPY 1/> REGIONS: CPT

## 2021-09-15 PROCEDURE — 97110 THERAPEUTIC EXERCISES: CPT

## 2021-09-15 NOTE — PROGRESS NOTES
Dx: Cervical myofascial pain syndrome (M79.18)          Authorized # of Visits:  2/12 (approved to 8)         Next MD visit: Dr. Spencer Mata 9/16  Fall Risk: Standard      Precautions: N/A         Subjective:  Pt reports he was sore post last session.  Pt repor increase for longer than 20 mins to not complete the exercise again that day but return to it the next day. Pt stated understanding.  Pain science education principles regarding the hyper sensitive nervous system were again introduced and reinforced today a

## 2021-09-16 ENCOUNTER — OFFICE VISIT (OUTPATIENT)
Dept: PHYSICAL MEDICINE AND REHAB | Facility: CLINIC | Age: 26
End: 2021-09-16
Payer: COMMERCIAL

## 2021-09-16 ENCOUNTER — TELEPHONE (OUTPATIENT)
Dept: PHYSICAL MEDICINE AND REHAB | Facility: CLINIC | Age: 26
End: 2021-09-16

## 2021-09-16 VITALS — BODY MASS INDEX: 52.48 KG/M2 | HEIGHT: 65 IN | WEIGHT: 315 LBS

## 2021-09-16 DIAGNOSIS — M79.18 CERVICAL MYOFASCIAL PAIN SYNDROME: Primary | ICD-10-CM

## 2021-09-16 PROCEDURE — 3008F BODY MASS INDEX DOCD: CPT | Performed by: PHYSICAL MEDICINE & REHABILITATION

## 2021-09-16 PROCEDURE — 99214 OFFICE O/P EST MOD 30 MIN: CPT | Performed by: PHYSICAL MEDICINE & REHABILITATION

## 2021-09-16 NOTE — TELEPHONE ENCOUNTER
Initiated authorization for Right cervical paraspinal and upper trapezius trigger point injections CPT 53591+ via [employer]- Transaction ID: [id number].     Status: Approved-authorization is not required per health plan    Routing to clinical staff to

## 2021-09-16 NOTE — PROGRESS NOTES
Progress note    C/C: right neck, arm pain    HPI: 28-year-old male presents for follow-up. Has been participating in physical therapy, taking duloxetine 30 mg.   He has seen some improvement in neck and upper shoulder pain radiating down the right arm to and we pursue right cervical paraspinal and upper trapezius trigger point injections. I discussed with him that since it has been a little less than 1 month he may continue to see benefit from his current dose of duloxetine.   He should follow-up for reass

## 2021-09-17 ENCOUNTER — OFFICE VISIT (OUTPATIENT)
Dept: PHYSICAL THERAPY | Facility: HOSPITAL | Age: 26
End: 2021-09-17
Attending: PHYSICAL MEDICINE & REHABILITATION
Payer: COMMERCIAL

## 2021-09-17 PROCEDURE — 97110 THERAPEUTIC EXERCISES: CPT

## 2021-09-17 NOTE — PROGRESS NOTES
Dx: Cervical myofascial pain syndrome (M79.18)          Authorized # of Visits:  2/12 (approved to 8)         Next MD visit: Dr. Farheen Youssef 9/16  Fall Risk: Standard      Precautions: N/A         Subjective:  Pt reports increased R sided pain this morning.  Wo 66/100 on FOTO to demonstrate return to maximum functional performance. 2. Boo Abernathy. will report he is able to clean his home with 2/10 pain or less to improve household ADL performance.    3. Boo Abernathy. will report he is able to

## 2021-09-21 ENCOUNTER — OFFICE VISIT (OUTPATIENT)
Dept: PHYSICAL THERAPY | Facility: HOSPITAL | Age: 26
End: 2021-09-21
Attending: PHYSICAL MEDICINE & REHABILITATION
Payer: COMMERCIAL

## 2021-09-21 PROCEDURE — 97110 THERAPEUTIC EXERCISES: CPT

## 2021-09-21 PROCEDURE — 97140 MANUAL THERAPY 1/> REGIONS: CPT

## 2021-09-21 NOTE — PROGRESS NOTES
Dx: Cervical myofascial pain syndrome (M79.18)          Authorized # of Visits:  5/12 (approved to 8)         Next MD visit: Dr. Bert Lund 9/16  Fall Risk: Standard      Precautions: N/A         Subjective:  Pt reports HA in the R post occiput described as t towards return to work status. Plan: Review neuromob and monitor response. Cont manual traction. Mickiel Chum. will continue with POC with focus on improving cervical position and cervical stabiltiy.     Charges: Tien 32, Man There 1 Total Slim Juarez

## 2021-09-22 ENCOUNTER — APPOINTMENT (OUTPATIENT)
Dept: PHYSICAL THERAPY | Facility: HOSPITAL | Age: 26
End: 2021-09-22
Attending: PHYSICAL MEDICINE & REHABILITATION
Payer: COMMERCIAL

## 2021-09-23 NOTE — TELEPHONE ENCOUNTER
Received WC request and HIPAA approval from Patient to send information to [employer] management. Faxed forms that was requested.

## 2021-09-24 ENCOUNTER — APPOINTMENT (OUTPATIENT)
Dept: PHYSICAL THERAPY | Facility: HOSPITAL | Age: 26
End: 2021-09-24
Attending: PHYSICAL MEDICINE & REHABILITATION
Payer: COMMERCIAL

## 2021-09-29 ENCOUNTER — APPOINTMENT (OUTPATIENT)
Dept: PHYSICAL THERAPY | Facility: HOSPITAL | Age: 26
End: 2021-09-29
Attending: PHYSICAL MEDICINE & REHABILITATION
Payer: COMMERCIAL

## 2021-10-01 ENCOUNTER — OFFICE VISIT (OUTPATIENT)
Dept: PHYSICAL THERAPY | Facility: HOSPITAL | Age: 26
End: 2021-10-01
Attending: PHYSICAL MEDICINE & REHABILITATION
Payer: COMMERCIAL

## 2021-10-01 PROCEDURE — 97110 THERAPEUTIC EXERCISES: CPT

## 2021-10-01 PROCEDURE — 97140 MANUAL THERAPY 1/> REGIONS: CPT

## 2021-10-01 NOTE — PROGRESS NOTES
Dx: Cervical myofascial pain syndrome (M79.18)          Authorized # of Visits:  6/12 (approved to 8)         Next MD visit: Dr. Sana Salcedo 9/16  Fall Risk: Standard      Precautions: N/A         Subjective: Pt states he has aching in the central lower thorac 2. Thermon Coffee. will report he is able to clean his home with 2/10 pain or less to improve household ADL performance.    3. Thermon Coffee. will report he is able to lift 50lbs from floor to waist 10x with 2/10 pain or less to demonstrate

## 2021-10-05 ENCOUNTER — OFFICE VISIT (OUTPATIENT)
Dept: PHYSICAL THERAPY | Facility: HOSPITAL | Age: 26
End: 2021-10-05
Attending: PHYSICAL MEDICINE & REHABILITATION
Payer: COMMERCIAL

## 2021-10-05 PROCEDURE — 97140 MANUAL THERAPY 1/> REGIONS: CPT

## 2021-10-05 NOTE — PROGRESS NOTES
Dx: Cervical myofascial pain syndrome (M79.18)          Authorized # of Visits:  7/12 (approved to 8)         Next MD visit: Dr. Bette Hernandez 9/16  Fall Risk: Standard      Precautions: N/A         Subjective: Pt states has had more tingling than pain recently. functional performance. 2. Jesus Encarnacion. will report he is able to clean his home with 2/10 pain or less to improve household ADL performance.    [facility] will report he is able to lift 50lbs from floor to waist 10x with 2/10 francesco

## 2021-10-06 ENCOUNTER — APPOINTMENT (OUTPATIENT)
Dept: PHYSICAL THERAPY | Facility: HOSPITAL | Age: 26
End: 2021-10-06
Attending: PHYSICAL MEDICINE & REHABILITATION
Payer: COMMERCIAL

## 2021-10-08 ENCOUNTER — APPOINTMENT (OUTPATIENT)
Dept: PHYSICAL THERAPY | Facility: HOSPITAL | Age: 26
End: 2021-10-08
Attending: PHYSICAL MEDICINE & REHABILITATION
Payer: COMMERCIAL

## 2021-10-14 ENCOUNTER — PATIENT MESSAGE (OUTPATIENT)
Dept: PHYSICAL MEDICINE AND REHAB | Facility: CLINIC | Age: 26
End: 2021-10-14

## 2021-10-16 NOTE — TELEPHONE ENCOUNTER
From: [employer]. To: Desean Quick DO  Sent: 10/14/2021 5:09 PM CDT  Subject: Fernando Jean,  W/C has stated I have reached [address] from my fractures and ulnar release and said I should've returned to work Mid May.  Treating

## 2021-10-18 ENCOUNTER — TELEPHONE (OUTPATIENT)
Dept: PHYSICAL THERAPY | Facility: HOSPITAL | Age: 26
End: 2021-10-18

## 2021-10-18 ENCOUNTER — PATIENT MESSAGE (OUTPATIENT)
Dept: NEUROLOGY | Facility: CLINIC | Age: 26
End: 2021-10-18

## 2021-10-18 ENCOUNTER — MED REC SCAN ONLY (OUTPATIENT)
Dept: OPHTHALMOLOGY | Facility: CLINIC | Age: 26
End: 2021-10-18

## 2021-10-18 RX ORDER — DULOXETIN HYDROCHLORIDE 30 MG/1
30 CAPSULE, DELAYED RELEASE ORAL DAILY
Qty: 90 CAPSULE | Refills: 0 | Status: SHIPPED | OUTPATIENT
Start: 2021-10-18 | End: 2022-01-14

## 2021-10-18 NOTE — TELEPHONE ENCOUNTER
From: Derek Ayon. To: Selin Breen DO  Sent: 10/18/2021 8:15 AM CDT  Subject: Prescription Refill     Good Morning Dr. Brian Napier,     I am running low on the Dulextine.  Do I need to come into see you for a refill or can you send it to the pharmac

## 2021-10-18 NOTE — TELEPHONE ENCOUNTER
LVM in response to pt's My Chart message. Inquired if based on his symptoms he would prefer to put PT on hold pending further consult with his referring physician. Requested return call.

## 2021-10-18 NOTE — TELEPHONE ENCOUNTER
LOV 8/18/21 started duloxetine, to increase dose from 30 mg to 60 mg.   8/31/21 unable to tolerate 60 mg dose, was decreased to 30 mg. Pend 30 mg ER  #90 R 0 to Dr Fontaine No.

## 2021-10-19 ENCOUNTER — OFFICE VISIT (OUTPATIENT)
Dept: OTOLARYNGOLOGY | Facility: CLINIC | Age: 26
End: 2021-10-19
Payer: COMMERCIAL

## 2021-10-19 ENCOUNTER — OFFICE VISIT (OUTPATIENT)
Dept: AUDIOLOGY | Facility: CLINIC | Age: 26
End: 2021-10-19
Payer: COMMERCIAL

## 2021-10-19 ENCOUNTER — OFFICE VISIT (OUTPATIENT)
Dept: PHYSICAL THERAPY | Facility: HOSPITAL | Age: 26
End: 2021-10-19
Attending: PHYSICAL MEDICINE & REHABILITATION
Payer: COMMERCIAL

## 2021-10-19 ENCOUNTER — PATIENT MESSAGE (OUTPATIENT)
Dept: PHYSICAL MEDICINE AND REHAB | Facility: CLINIC | Age: 26
End: 2021-10-19

## 2021-10-19 ENCOUNTER — TELEPHONE (OUTPATIENT)
Dept: PHYSICAL THERAPY | Facility: HOSPITAL | Age: 26
End: 2021-10-19

## 2021-10-19 VITALS — BODY MASS INDEX: 52.48 KG/M2 | WEIGHT: 315 LBS | HEIGHT: 65 IN

## 2021-10-19 DIAGNOSIS — R42 DIZZINESS: Primary | ICD-10-CM

## 2021-10-19 DIAGNOSIS — M79.18 CERVICAL MYOFASCIAL PAIN SYNDROME: Primary | ICD-10-CM

## 2021-10-19 DIAGNOSIS — G56.21 CUBITAL TUNNEL SYNDROME ON RIGHT: ICD-10-CM

## 2021-10-19 PROCEDURE — 92557 COMPREHENSIVE HEARING TEST: CPT | Performed by: AUDIOLOGIST

## 2021-10-19 PROCEDURE — 99213 OFFICE O/P EST LOW 20 MIN: CPT | Performed by: OTOLARYNGOLOGY

## 2021-10-19 PROCEDURE — 97140 MANUAL THERAPY 1/> REGIONS: CPT

## 2021-10-19 PROCEDURE — 3008F BODY MASS INDEX DOCD: CPT | Performed by: OTOLARYNGOLOGY

## 2021-10-19 PROCEDURE — 92567 TYMPANOMETRY: CPT | Performed by: AUDIOLOGIST

## 2021-10-19 NOTE — PROGRESS NOTES
Alfreda Child. is a 31year old male. Patient presents with:  Dizziness: pt is here today for having dizzy episodes. He feels limp when he gets the episodes.  when he gets the episodes when he is at neck therapy and bring him up quickly and it last lung (cause of death)   • Hypertension Father    • Diabetes Mother    • Crohn's Disease Mother    • Diabetes Maternal Grandmother    • Hypertension Paternal Grandmother        Past Medical History:   Diagnosis Date   • History of stomach ulcers    • Myopia - Cranial nerves II through XII grossly intact.    Head/Face Normal Facial features - Normal. Eyebrows - Normal. Skull - Normal.        Nasopharynx Normal External nose - Normal. Lips/teeth/gums - Normal. Tonsils - Normal. Oropharynx - Normal.   Ears Normal Medical voice recognition dictation software. As a result errors may occur. When identified these errors have been corrected. While every attempt is made to correct errors during dictation discrepancies may still exist    José Luis Borges MD    10/19/2021

## 2021-10-19 NOTE — PROGRESS NOTES
Dx: Cervical myofascial pain syndrome (M79.18)          Authorized # of Visits:  8/12 (approved to 8)         Next MD visit:   Fall Risk: Standard      Precautions: N/A         Subjective: Pt states he felt good walking this weekend other than a few time date: cerv retraction, gentle neuromob (R)UE, gentle R cervical and upper trap stretching    Assessment:    Pt reported \"dizziness\" following rx again after sitting up from supine rx.  He lists to the R in sitting for several minutes and then lists in bot

## 2021-10-20 NOTE — TELEPHONE ENCOUNTER
From: Nicanor Irby.   To: Melisa Nieto DO  Sent: 10/19/2021 11:09 AM CDT  Subject: FCE     Good Morning Dr. Lucien Quevedo,    I have just seen my ENT Dr. Coleen Otero he has recommended I do a balance test. [facility] also he

## 2021-10-27 ENCOUNTER — OFFICE VISIT (OUTPATIENT)
Dept: PHYSICAL THERAPY | Facility: HOSPITAL | Age: 26
End: 2021-10-27
Attending: FAMILY MEDICINE
Payer: COMMERCIAL

## 2021-10-27 PROCEDURE — 97140 MANUAL THERAPY 1/> REGIONS: CPT

## 2021-10-27 NOTE — PROGRESS NOTES
Dx: Cervical myofascial pain syndrome (M79.18)          Authorized # of Visits:  8/12 (approved to 8)  + 1/4       Next MD visit: 11/3 for trigger point injections  Fall Risk: Standard      Precautions: N/A         Subjective: Pt states he feels he is wa X supine   seated prior to STM     HEP to date: cerv retraction, gentle neuromob (R)UE, gentle R cervical and upper trap stretching    Assessment:    Pt reported \"dizziness\" following rx again after sitting up from supine rx with listing to the R in sitt

## 2021-11-03 ENCOUNTER — PATIENT MESSAGE (OUTPATIENT)
Dept: PHYSICAL MEDICINE AND REHAB | Facility: CLINIC | Age: 26
End: 2021-11-03

## 2021-11-03 ENCOUNTER — PATIENT MESSAGE (OUTPATIENT)
Dept: OTOLARYNGOLOGY | Facility: CLINIC | Age: 26
End: 2021-11-03

## 2021-11-03 ENCOUNTER — OFFICE VISIT (OUTPATIENT)
Dept: PHYSICAL THERAPY | Facility: HOSPITAL | Age: 26
End: 2021-11-03
Attending: FAMILY MEDICINE
Payer: COMMERCIAL

## 2021-11-03 PROCEDURE — 97140 MANUAL THERAPY 1/> REGIONS: CPT

## 2021-11-03 NOTE — TELEPHONE ENCOUNTER
Patient states he's been experiencing headcahes, nausea, and dry heaving. Was at physical therapy and Chiquita informed patient to make sure he sends this information over to these providers  He's contacted primary, neurologist and ENT for an update.     Nausea s

## 2021-11-03 NOTE — TELEPHONE ENCOUNTER
From: Maximilian Kelly.   To: Vanesa Morataya DO  Sent: 11/3/2021 1:47 PM CDT  Subject: Nausea, sweaty, dry heaving     Hi Dr Ananda Rahman at therapy wanted me to reach out to you and let you know that I am having new and persistent symptoms of naus

## 2021-11-03 NOTE — PROGRESS NOTES
Dx: Cervical myofascial pain syndrome (M79.18)          Authorized # of Visits:  8/12 (approved to 8)  + 2/4       Next MD visit: 11/3 for trigger point injections  Fall Risk: Standard      Precautions: N/A         Subjective: Pt states he continues to f X  X     X  MET for OA    Neuro ReEducation         Modalities  HP to R cervical, upper thoracic, supraclavicular region.    X supine   seated prior to STM      HEP to date: cerv retraction, gentle neuromob (R)UE, gentle R cervical and upper trap stretching

## 2021-11-04 ENCOUNTER — OFFICE VISIT (OUTPATIENT)
Dept: PHYSICAL MEDICINE AND REHAB | Facility: CLINIC | Age: 26
End: 2021-11-04
Payer: COMMERCIAL

## 2021-11-04 ENCOUNTER — MED REC SCAN ONLY (OUTPATIENT)
Dept: PHYSICAL MEDICINE AND REHAB | Facility: CLINIC | Age: 26
End: 2021-11-04

## 2021-11-04 ENCOUNTER — TELEPHONE (OUTPATIENT)
Dept: NEUROLOGY | Facility: CLINIC | Age: 26
End: 2021-11-04

## 2021-11-04 VITALS
DIASTOLIC BLOOD PRESSURE: 70 MMHG | BODY MASS INDEX: 52.48 KG/M2 | HEIGHT: 65 IN | OXYGEN SATURATION: 95 % | SYSTOLIC BLOOD PRESSURE: 130 MMHG | HEART RATE: 87 BPM | WEIGHT: 315 LBS

## 2021-11-04 DIAGNOSIS — M54.16 RIGHT LUMBAR RADICULITIS: ICD-10-CM

## 2021-11-04 DIAGNOSIS — G44.201 ACUTE INTRACTABLE TENSION-TYPE HEADACHE: Primary | ICD-10-CM

## 2021-11-04 PROCEDURE — 3008F BODY MASS INDEX DOCD: CPT | Performed by: PHYSICAL MEDICINE & REHABILITATION

## 2021-11-04 PROCEDURE — 3075F SYST BP GE 130 - 139MM HG: CPT | Performed by: PHYSICAL MEDICINE & REHABILITATION

## 2021-11-04 PROCEDURE — 99214 OFFICE O/P EST MOD 30 MIN: CPT | Performed by: PHYSICAL MEDICINE & REHABILITATION

## 2021-11-04 PROCEDURE — 3078F DIAST BP <80 MM HG: CPT | Performed by: PHYSICAL MEDICINE & REHABILITATION

## 2021-11-04 NOTE — PROGRESS NOTES
Progress note    C/C: nausea, vomiting, headache, right lower back pain and numbness    HPI: 27-year-old male presents for follow-up. We had planned for right cervical paraspinal and upper trapezius trigger point injections today.   He has been participati 95%. BMI 57.74. Lumbar spine exam:    No pain with lumbar flexion. No pain with lumbar extension.   5/5 LE strength b/l in HF, KE, ADF, EHL, ankle eversion  SILT b/l LE  2/4 quad, gastrocs reflexes b/l  Seated slump test negative  SLR + on the right, ne [place]

## 2021-11-04 NOTE — TELEPHONE ENCOUNTER
[employer] for authorization of approval for MRI brain w/wo cpt code 73107. Authorization #  [id number]  effective 11/04/2021 - 01/02/2022. Pt. Informed. Transferred call to scheduling for appt.

## 2021-11-05 NOTE — TELEPHONE ENCOUNTER
Please call in ondansetron 4 mg to be used every 8 hours as needed number 30 tablets. Please let him know that for his anxiety and other issues he should contact his primary care physician.   This medication will help with the dry heaves and his sense of n

## 2021-11-05 NOTE — TELEPHONE ENCOUNTER
From: Stella Mendoza. To: Anish Magaña.  Nando Peng MD  Sent: 11/3/2021 1:49 PM CDT  Subject: Nausea, sweaty, anxious dry heaving     Good afternoon Dr. Edmond Guess at therapy wanted me to reach out to you and let you know that I am having new and persis

## 2021-11-08 ENCOUNTER — HOSPITAL ENCOUNTER (OUTPATIENT)
Dept: MRI IMAGING | Age: 26
Discharge: HOME | End: 2021-11-08
Attending: PHYSICAL MEDICINE & REHABILITATION
Payer: COMMERCIAL

## 2021-11-08 DIAGNOSIS — G44.201 ACUTE INTRACTABLE TENSION-TYPE HEADACHE: ICD-10-CM

## 2021-11-08 PROCEDURE — A9575 INJ GADOTERATE MEGLUMI 0.1ML: HCPCS | Performed by: PHYSICAL MEDICINE & REHABILITATION

## 2021-11-08 PROCEDURE — 70553 MRI BRAIN STEM W/O & W/DYE: CPT | Performed by: PHYSICAL MEDICINE & REHABILITATION

## 2021-11-08 RX ORDER — ONDANSETRON 4 MG/1
4 TABLET, ORALLY DISINTEGRATING ORAL EVERY 8 HOURS PRN
Qty: 30 TABLET | Refills: 0 | Status: SHIPPED | OUTPATIENT
Start: 2021-11-08

## 2021-11-10 ENCOUNTER — TELEPHONE (OUTPATIENT)
Dept: NEUROLOGY | Facility: CLINIC | Age: 26
End: 2021-11-10

## 2021-11-10 DIAGNOSIS — M54.12 RADICULITIS OF RIGHT CERVICAL REGION: Primary | ICD-10-CM

## 2021-11-10 NOTE — TELEPHONE ENCOUNTER
----- Message from Jose Crane sent at 11/8/2021  4:11 PM CST -----    ----- Message -----  From: Orquidea Woodall DO  Sent: 11/8/2021   4:10 PM CST  To: Lukas Almonte Nurse    Please let the patient know the MRI of the brain is normal. He should start t

## 2021-11-10 NOTE — TELEPHONE ENCOUNTER
Spoke to patient, relayed information from Dr Camila Harrell note 11/8/21. Medrol dose pack pend to Dr Kari Leonard. States has balance test from Dr Anastasia Emmanuel scheduled, should he wait on medrol dose pack until after tests done?  I advised would confirm with Dr Francine Garcia

## 2021-11-12 RX ORDER — METHYLPREDNISOLONE 4 MG/1
TABLET ORAL
Qty: 1 EACH | Refills: 1 | Status: SHIPPED | OUTPATIENT
Start: 2021-11-12 | End: 2021-11-15

## 2021-11-13 ENCOUNTER — PATIENT MESSAGE (OUTPATIENT)
Dept: PHYSICAL MEDICINE AND REHAB | Facility: CLINIC | Age: 26
End: 2021-11-13

## 2021-11-13 DIAGNOSIS — M54.12 RADICULITIS OF RIGHT CERVICAL REGION: ICD-10-CM

## 2021-11-15 RX ORDER — METHYLPREDNISOLONE 4 MG/1
TABLET ORAL
Qty: 1 EACH | Refills: 1 | Status: SHIPPED | OUTPATIENT
Start: 2021-11-15 | End: 2021-12-02

## 2021-11-15 NOTE — TELEPHONE ENCOUNTER
From: Padmini Short. To: Bi Walker DO  Sent: 11/13/2021 8:59 AM CST  Subject: Wrong Pharmacy     [facility],     Can you guys send the prescription to the [facility]. The Address is [place] [address]. [facility] [address], [facility]. [address]    Thank you!

## 2021-11-15 NOTE — TELEPHONE ENCOUNTER
Called [facility] in [place] to remove this rx transferring it to [place] [address] in [place], [place]. Please sign order.

## 2021-11-15 NOTE — TELEPHONE ENCOUNTER
Spoke to patient, advised Dr Tomas Larose would like him to start medrol dose pack as directed in routing comment. Patient states will  from pharmacy and start medication.

## 2021-11-16 ENCOUNTER — OFFICE VISIT (OUTPATIENT)
Dept: AUDIOLOGY | Facility: CLINIC | Age: 26
End: 2021-11-16
Payer: COMMERCIAL

## 2021-11-16 DIAGNOSIS — R42 DIZZINESS: Primary | ICD-10-CM

## 2021-11-16 PROCEDURE — 92540 BASIC VESTIBULAR EVALUATION: CPT | Performed by: AUDIOLOGIST

## 2021-11-16 PROCEDURE — 92567 TYMPANOMETRY: CPT | Performed by: AUDIOLOGIST

## 2021-11-16 PROCEDURE — 92537 CALORIC VSTBLR TEST W/REC: CPT | Performed by: AUDIOLOGIST

## 2021-11-16 NOTE — PROGRESS NOTES
Videonystagmography   (VNG)    Boo Jacobson is a 31year old male     Referring Provider: Philmore Dakin   YOB: 1995  Medical Record: [id number]                           History:  Patient reported a complaint of several month histo without head hanging back. No evidence of torsional nystagmus during the Pearson-Balm maneuver. Also no report of vertigo.      Positional Testing:  Sitting:  No horizontal nystagmus  Supine:  No horizontal nystagmus  Head Right: No horizontal nystagmus  He

## 2021-11-18 ENCOUNTER — OFFICE VISIT (OUTPATIENT)
Dept: OTOLARYNGOLOGY | Facility: CLINIC | Age: 26
End: 2021-11-18
Payer: COMMERCIAL

## 2021-11-18 VITALS — HEIGHT: 65 IN | WEIGHT: 315 LBS | BODY MASS INDEX: 52.48 KG/M2 | TEMPERATURE: 99 F

## 2021-11-18 DIAGNOSIS — R42 DIZZINESS: Primary | ICD-10-CM

## 2021-11-18 DIAGNOSIS — H92.12 OTORRHEA OF LEFT EAR: ICD-10-CM

## 2021-11-18 PROCEDURE — 92504 EAR MICROSCOPY EXAMINATION: CPT | Performed by: OTOLARYNGOLOGY

## 2021-11-18 PROCEDURE — 99214 OFFICE O/P EST MOD 30 MIN: CPT | Performed by: OTOLARYNGOLOGY

## 2021-11-18 PROCEDURE — 3008F BODY MASS INDEX DOCD: CPT | Performed by: OTOLARYNGOLOGY

## 2021-11-19 ENCOUNTER — PATIENT MESSAGE (OUTPATIENT)
Dept: PHYSICAL MEDICINE AND REHAB | Facility: CLINIC | Age: 26
End: 2021-11-19

## 2021-11-19 RX ORDER — TOBRAMYCIN AND DEXAMETHASONE 3; 1 MG/ML; MG/ML
3 SUSPENSION/ DROPS OPHTHALMIC EVERY 8 HOURS
Qty: 10 ML | Refills: 1 | Status: SHIPPED | OUTPATIENT
Start: 2021-11-19

## 2021-11-19 NOTE — PROGRESS NOTES
Boo Abernathy. is a 31year old male.   Patient presents with:  Test Results: discuss VNG results       HISTORY OF PRESENT ILLNESS  Previous tube placement about 8 years ago. Iveth Diaz on the left side.  Doing well up until recently when he started noti these episodes and it might have been 1 or more severe episodes where he became almost nonfunctional after the calorics were performed in the right ear. He had to rest for several minutes before he could even going to the next process.   Also continues to MYRINGOTOMY, LASER-ASSISTED     • OTHER Right 10/26/2020    right forearm compound fracture   • OTHER SURGICAL HISTORY      Myringotomy and tube placement   • OTHER SURGICAL HISTORY Right     compound fx right forearm    • OTHER SURGICAL HISTORY Right noted on the drum at the edge of the tube circumferentially after ear cleaning. No other abnormalities debris within the left ear was removed using suction microscopy   Skin Normal Inspection - Normal.        Lymph Detail Normal Submental. Submandibular. Reported on 11/18/2021), Disp: 10 mL, Rfl: 0  ASSESSMENT AND PLAN    1. Dizziness    2.  Otorrhea of left ear  Physical examination did reveal some debris on his tympanic membrane surrounding his tube on the left and this was removed using suction microscop AM

## 2021-11-22 ENCOUNTER — TELEPHONE (OUTPATIENT)
Dept: OTOLARYNGOLOGY | Facility: CLINIC | Age: 26
End: 2021-11-22

## 2021-11-22 ENCOUNTER — OFFICE VISIT (OUTPATIENT)
Dept: PHYSICAL THERAPY | Facility: HOSPITAL | Age: 26
End: 2021-11-22
Attending: FAMILY MEDICINE
Payer: COMMERCIAL

## 2021-11-22 PROCEDURE — 97140 MANUAL THERAPY 1/> REGIONS: CPT

## 2021-11-22 NOTE — PROGRESS NOTES
Dx: Cervical myofascial pain syndrome (M79.18)          Authorized # of Visits:  8/12 (approved to 8)  + 3/4       Next MD visit: 11/3 for trigger point injections  Fall Risk: Standard      Precautions: N/A         Subjective: Pt states he saw Dr. Delmis Cuenca for SCM, Mika. row with green 10x2   Manual Gentle manual traction  pivotal inhibition and gentle manual traction  Minimal STM/MFR (R) lat cervical  X     STM/MFR R cervical and upper thoracic sup.   X     X and seated  X     X  X     X  MET for OA

## 2021-11-22 NOTE — TELEPHONE ENCOUNTER
Author: Maikel Tirado MD Service: Leilani Human Author Type: Physician   Filed: 11/17/2021  9:43 PM Encounter Date: 11/16/2021 Status: Signed   : Maikel Tirado MD (Physician)            Show:Clear all  [x]Manual[]Template[]Copied    Added by:  Melchor Womackkeeper

## 2021-11-22 NOTE — TELEPHONE ENCOUNTER
From: Ela Cruz. To: Akhil Falcon DO  Sent: 11/19/2021 3:14 PM CST  Subject: Injury     Dr. Sandhu Hand workers comp wants to settle.  Is my current issues with my neck, numbness in my arm, pins and needles in my leg, and the Melissa Shallow I feel in my w

## 2021-11-23 NOTE — TELEPHONE ENCOUNTER
I did get Dr. Maggy Bacon note. They can use my progress notes for what they need. I saw him well after he had surgery and had already been off work for [employer], and I cannot comment on whether this is work related or not.

## 2021-11-24 ENCOUNTER — OFFICE VISIT (OUTPATIENT)
Dept: PHYSICAL THERAPY | Facility: HOSPITAL | Age: 26
End: 2021-11-24
Attending: FAMILY MEDICINE
Payer: COMMERCIAL

## 2021-11-24 PROCEDURE — 97140 MANUAL THERAPY 1/> REGIONS: CPT

## 2021-11-24 NOTE — PROGRESS NOTES
ProgressSummary  Pt has attended 12 visits in Physical Therapy.      Dx: Cervical myofascial pain syndrome (M79.18)           Subjective: Pt reports the following improvements:  · Severity of pain in the (R) ant and lat cervical, upper thoracic, and chest demonstrate return to maximum functional performance. - not met. Improved from 45 to 48/100  2.  Farhadjann Child. will report he is able to clean his home with 2/10 pain or less to improve household ADL performance - he is able to do more but the pain

## 2021-12-02 ENCOUNTER — OFFICE VISIT (OUTPATIENT)
Dept: NEUROLOGY | Facility: CLINIC | Age: 26
End: 2021-12-02
Payer: COMMERCIAL

## 2021-12-02 ENCOUNTER — LAB ENCOUNTER (OUTPATIENT)
Dept: LAB | Facility: HOSPITAL | Age: 26
End: 2021-12-02
Attending: Other
Payer: COMMERCIAL

## 2021-12-02 ENCOUNTER — TELEPHONE (OUTPATIENT)
Dept: NEUROLOGY | Facility: CLINIC | Age: 26
End: 2021-12-02

## 2021-12-02 ENCOUNTER — TELEPHONE (OUTPATIENT)
Dept: PHYSICAL MEDICINE AND REHAB | Facility: CLINIC | Age: 26
End: 2021-12-02

## 2021-12-02 VITALS
HEIGHT: 65 IN | SYSTOLIC BLOOD PRESSURE: 132 MMHG | BODY MASS INDEX: 40.98 KG/M2 | DIASTOLIC BLOOD PRESSURE: 78 MMHG | WEIGHT: 246 LBS | HEART RATE: 92 BPM

## 2021-12-02 DIAGNOSIS — M54.81 OCCIPITAL NEURALGIA OF RIGHT SIDE: ICD-10-CM

## 2021-12-02 DIAGNOSIS — R41.89 COGNITIVE CHANGES: ICD-10-CM

## 2021-12-02 DIAGNOSIS — G44.86 CERVICOGENIC HEADACHE: ICD-10-CM

## 2021-12-02 DIAGNOSIS — G43.011 INTRACTABLE MIGRAINE WITHOUT AURA AND WITH STATUS MIGRAINOSUS: Primary | ICD-10-CM

## 2021-12-02 PROCEDURE — 36415 COLL VENOUS BLD VENIPUNCTURE: CPT

## 2021-12-02 PROCEDURE — 99215 OFFICE O/P EST HI 40 MIN: CPT | Performed by: OTHER

## 2021-12-02 PROCEDURE — 86235 NUCLEAR ANTIGEN ANTIBODY: CPT

## 2021-12-02 PROCEDURE — 86225 DNA ANTIBODY NATIVE: CPT

## 2021-12-02 PROCEDURE — 84425 ASSAY OF VITAMIN B-1: CPT

## 2021-12-02 PROCEDURE — 82607 VITAMIN B-12: CPT

## 2021-12-02 PROCEDURE — 84207 ASSAY OF VITAMIN B-6: CPT

## 2021-12-02 PROCEDURE — 84443 ASSAY THYROID STIM HORMONE: CPT

## 2021-12-02 PROCEDURE — 3075F SYST BP GE 130 - 139MM HG: CPT | Performed by: OTHER

## 2021-12-02 PROCEDURE — 82525 ASSAY OF COPPER: CPT

## 2021-12-02 PROCEDURE — 86038 ANTINUCLEAR ANTIBODIES: CPT

## 2021-12-02 PROCEDURE — 80053 COMPREHEN METABOLIC PANEL: CPT

## 2021-12-02 PROCEDURE — 86039 ANTINUCLEAR ANTIBODIES (ANA): CPT

## 2021-12-02 PROCEDURE — 3008F BODY MASS INDEX DOCD: CPT | Performed by: OTHER

## 2021-12-02 PROCEDURE — 83921 ORGANIC ACID SINGLE QUANT: CPT

## 2021-12-02 PROCEDURE — 85025 COMPLETE CBC W/AUTO DIFF WBC: CPT

## 2021-12-02 PROCEDURE — 3078F DIAST BP <80 MM HG: CPT | Performed by: OTHER

## 2021-12-02 PROCEDURE — 82140 ASSAY OF AMMONIA: CPT

## 2021-12-02 RX ORDER — RIZATRIPTAN BENZOATE 10 MG/1
TABLET ORAL
Qty: 12 TABLET | Refills: 0 | Status: SHIPPED | OUTPATIENT
Start: 2021-12-02

## 2021-12-02 NOTE — TELEPHONE ENCOUNTER
Spoke to patient and notified him of approval for occipital nerve block and MRV. He will call and get MRV scheduled. Infusion center will be calling to get him scheduled for infusion, possibly for tomorrow.   He is on his way to get labs drawn; per office

## 2021-12-02 NOTE — PROGRESS NOTES
[address] [facility]  [address], [address] [place] [address]  [phone]          Established  Follow Up Visit       Date: December 2, 2021  Patient Name: Jesus Encarnacion.    MRN: [id number]  Primary physician: Dylan Lubin Cymbalta and that it is \"changing him. \"  However, there is a gap between when he takes Cymbalta and nausea occurs. He is interested in continuing it. Could not tolerate 60 mg dosing.       --With deep tissue massage has shooting right occipital pain geovanni WITH THE IMPRESSION.      ASSESSMENT:  The patient is a 31year old man with past medical history of of migraine, cervical myofascial pain syndrome, cervical and lumbar radiculitis, gastric ulcer, obesity who presents for follow up regarding right occipital spatial/executive, attention, delayed recall and orientation on MoCA testing, broad differential at this point: Frequent migraines, metabolic, possibly autoimmune; any changes can be seen with fibromyalgia/myofascial pain but this is a diagnosis of exclusi

## 2021-12-02 NOTE — TELEPHONE ENCOUNTER
Initiated authorization for MRV Head CPT 35540 to be done at [address] [place] [address] with [employer] online    Status: Approved with order #[id number] valid 12/2/21-1/30/22    Patient notified via [employer]    Initiated authorization for Right occipital nerve block   CPT 36248+ (6

## 2021-12-02 NOTE — TELEPHONE ENCOUNTER
Patient seen in office today. Order for infusion therapy placed by Dr. Han Chambers. Faxed to infusion center with face sheet. Patient will await insurance authorization and then call from infusion center to schedule.

## 2021-12-02 NOTE — PATIENT INSTRUCTIONS
-Rescue: Maxalt 10 mg for migraine that does not respond to Advil   -MRV head  -Infusion treatment for your headaches   -Occipital nerve block   -Please obtain blood work prior to infusion   -Neuropsychological testing   -Follow up in 3 months or sooner if [place] , [place]  ph: [phone]    Dr. Nori Ch[facility]vez [facility]  [facility], [address] [facility], [facility]  ph: [phone]  fax: [phone]    Dr. Gustavo Rose

## 2021-12-03 ENCOUNTER — TELEPHONE (OUTPATIENT)
Dept: PHYSICAL MEDICINE AND REHAB | Facility: CLINIC | Age: 26
End: 2021-12-03

## 2021-12-03 ENCOUNTER — TELEPHONE (OUTPATIENT)
Dept: NEUROLOGY | Facility: CLINIC | Age: 26
End: 2021-12-03

## 2021-12-03 NOTE — TELEPHONE ENCOUNTER
Yeni Allen,   [address], [place], [place]  Caller: Unspecified (Today,  9:41 AM)  Hello,     Of the labs that have been resulted, he has a very slightly elevated liver function test (66 when upper limit of normal is 61), the other liver tests look normal. He a

## 2021-12-03 NOTE — TELEPHONE ENCOUNTER
Central Scheduling has questions regarding Occipital Nerve Block that Patient is trying to schedule. Please advise.   [phone]

## 2021-12-03 NOTE — TELEPHONE ENCOUNTER
Spoke to patient and reviewed information below. He was understanding. New infusion therapy order faxed to infusion center. Confirmed that it was received. They will call patient to schedule.      Labs results printed and faxed to patient's PCP,

## 2021-12-03 NOTE — TELEPHONE ENCOUNTER
Spoke to patient this morning- he was wondering if he was supposed to fast for his bloodwork since he did not. I let him know he did not. Pt just waiting for feedback on labwork and from there will schedule infusion.

## 2021-12-06 ENCOUNTER — OFFICE VISIT (OUTPATIENT)
Dept: HEMATOLOGY/ONCOLOGY | Facility: HOSPITAL | Age: 26
End: 2021-12-06
Attending: Other
Payer: COMMERCIAL

## 2021-12-06 VITALS
TEMPERATURE: 98 F | HEART RATE: 82 BPM | SYSTOLIC BLOOD PRESSURE: 130 MMHG | DIASTOLIC BLOOD PRESSURE: 67 MMHG | OXYGEN SATURATION: 96 % | RESPIRATION RATE: 18 BRPM

## 2021-12-06 DIAGNOSIS — G43.011 INTRACTABLE MIGRAINE WITHOUT AURA WITH STATUS MIGRAINOSUS: Primary | ICD-10-CM

## 2021-12-06 PROCEDURE — 96361 HYDRATE IV INFUSION ADD-ON: CPT

## 2021-12-06 PROCEDURE — 96368 THER/DIAG CONCURRENT INF: CPT

## 2021-12-06 PROCEDURE — 96365 THER/PROPH/DIAG IV INF INIT: CPT

## 2021-12-06 PROCEDURE — 96375 TX/PRO/DX INJ NEW DRUG ADDON: CPT

## 2021-12-06 RX ORDER — MAGNESIUM SULFATE HEPTAHYDRATE 40 MG/ML
2 INJECTION, SOLUTION INTRAVENOUS ONCE
Status: COMPLETED | OUTPATIENT
Start: 2021-12-06 | End: 2021-12-06

## 2021-12-06 RX ORDER — PROCHLORPERAZINE EDISYLATE 5 MG/ML
INJECTION INTRAMUSCULAR; INTRAVENOUS
Status: COMPLETED
Start: 2021-12-06 | End: 2021-12-06

## 2021-12-06 RX ORDER — PROCHLORPERAZINE MALEATE 10 MG
TABLET ORAL
Status: DISCONTINUED
Start: 2021-12-06 | End: 2021-12-06 | Stop reason: WASHOUT

## 2021-12-06 RX ORDER — KETOROLAC TROMETHAMINE 30 MG/ML
30 INJECTION, SOLUTION INTRAMUSCULAR; INTRAVENOUS ONCE
Status: COMPLETED | OUTPATIENT
Start: 2021-12-06 | End: 2021-12-06

## 2021-12-06 RX ORDER — MAGNESIUM SULFATE HEPTAHYDRATE 40 MG/ML
INJECTION, SOLUTION INTRAVENOUS
Status: COMPLETED
Start: 2021-12-06 | End: 2021-12-06

## 2021-12-06 RX ORDER — DIPHENHYDRAMINE HYDROCHLORIDE 50 MG/ML
INJECTION INTRAMUSCULAR; INTRAVENOUS
Status: COMPLETED
Start: 2021-12-06 | End: 2021-12-06

## 2021-12-06 RX ORDER — KETOROLAC TROMETHAMINE 30 MG/ML
30 INJECTION, SOLUTION INTRAMUSCULAR; INTRAVENOUS ONCE
Status: CANCELLED
Start: 2021-12-06 | End: 2021-12-06

## 2021-12-06 RX ORDER — MAGNESIUM SULFATE HEPTAHYDRATE 40 MG/ML
2 INJECTION, SOLUTION INTRAVENOUS ONCE
Status: CANCELLED
Start: 2021-12-06 | End: 2021-12-06

## 2021-12-06 RX ORDER — PROCHLORPERAZINE EDISYLATE 5 MG/ML
10 INJECTION INTRAMUSCULAR; INTRAVENOUS ONCE
Status: COMPLETED | OUTPATIENT
Start: 2021-12-06 | End: 2021-12-06

## 2021-12-06 RX ORDER — DIPHENHYDRAMINE HYDROCHLORIDE 50 MG/ML
50 INJECTION INTRAMUSCULAR; INTRAVENOUS ONCE
Status: CANCELLED
Start: 2021-12-06 | End: 2021-12-06

## 2021-12-06 RX ORDER — DIPHENHYDRAMINE HYDROCHLORIDE 50 MG/ML
50 INJECTION INTRAMUSCULAR; INTRAVENOUS ONCE
Status: COMPLETED | OUTPATIENT
Start: 2021-12-06 | End: 2021-12-06

## 2021-12-06 RX ORDER — PROCHLORPERAZINE EDISYLATE 5 MG/ML
10 INJECTION INTRAMUSCULAR; INTRAVENOUS ONCE
Status: CANCELLED
Start: 2021-12-06 | End: 2021-12-06

## 2021-12-06 RX ORDER — KETOROLAC TROMETHAMINE 30 MG/ML
INJECTION, SOLUTION INTRAMUSCULAR; INTRAVENOUS
Status: COMPLETED
Start: 2021-12-06 | End: 2021-12-06

## 2021-12-06 RX ADMIN — DIPHENHYDRAMINE HYDROCHLORIDE 50 MG: 50 INJECTION INTRAMUSCULAR; INTRAVENOUS at 14:54:00

## 2021-12-06 RX ADMIN — MAGNESIUM SULFATE HEPTAHYDRATE 2 G: 40 INJECTION, SOLUTION INTRAVENOUS at 14:53:00

## 2021-12-06 RX ADMIN — PROCHLORPERAZINE EDISYLATE 10 MG: 5 INJECTION INTRAMUSCULAR; INTRAVENOUS at 14:53:00

## 2021-12-06 RX ADMIN — KETOROLAC TROMETHAMINE 30 MG: 30 INJECTION, SOLUTION INTRAMUSCULAR; INTRAVENOUS at 14:53:00

## 2021-12-08 ENCOUNTER — TELEPHONE (OUTPATIENT)
Dept: PHYSICAL THERAPY | Facility: HOSPITAL | Age: 26
End: 2021-12-08

## 2021-12-08 ENCOUNTER — OFFICE VISIT (OUTPATIENT)
Dept: NEUROLOGY | Facility: CLINIC | Age: 26
End: 2021-12-08
Payer: COMMERCIAL

## 2021-12-08 VITALS — HEIGHT: 65 IN | WEIGHT: 246 LBS | BODY MASS INDEX: 40.98 KG/M2

## 2021-12-08 DIAGNOSIS — M54.81 OCCIPITAL NEURALGIA OF RIGHT SIDE: Primary | ICD-10-CM

## 2021-12-08 PROCEDURE — 64405 NJX AA&/STRD GR OCPL NRV: CPT | Performed by: OTHER

## 2021-12-08 PROCEDURE — 3008F BODY MASS INDEX DOCD: CPT | Performed by: OTHER

## 2021-12-08 RX ORDER — LIDOCAINE HYDROCHLORIDE 10 MG/ML
2 INJECTION, SOLUTION INFILTRATION; PERINEURAL ONCE
Status: COMPLETED | OUTPATIENT
Start: 2021-12-08 | End: 2021-12-08

## 2021-12-08 RX ORDER — TRIAMCINOLONE ACETONIDE 40 MG/ML
40 INJECTION, SUSPENSION INTRA-ARTICULAR; INTRAMUSCULAR ONCE
Status: COMPLETED | OUTPATIENT
Start: 2021-12-08 | End: 2021-12-08

## 2021-12-08 NOTE — TELEPHONE ENCOUNTER
Phoned pt to address his MyChart message regarding shoulder pain following doing the row with theraband for scapular stabilization.  Reviewed technique and advised pt to decrease to one set of 10, gradually increase if well-tolerated, and put on hold if sti

## 2021-12-08 NOTE — PROCEDURES
Procedure Note: Right Greater Occipital Nerve Block    Indications:  Severe right occipital pain    Informed consent was obtained (explaining the procedure and risks and benefits of procedure) from patient:  the signed consent form was placed in the medica

## 2021-12-11 ENCOUNTER — HOSPITAL ENCOUNTER (EMERGENCY)
Facility: HOSPITAL | Age: 26
Discharge: HOME | End: 2021-12-11
Attending: EMERGENCY MEDICINE
Payer: COMMERCIAL

## 2021-12-11 ENCOUNTER — APPOINTMENT (OUTPATIENT)
Dept: ULTRASOUND IMAGING | Facility: HOSPITAL | Age: 26
End: 2021-12-11
Attending: EMERGENCY MEDICINE
Payer: COMMERCIAL

## 2021-12-11 ENCOUNTER — HOSPITAL ENCOUNTER (OUTPATIENT)
Dept: MRI IMAGING | Facility: HOSPITAL | Age: 26
Discharge: HOME | End: 2021-12-11
Attending: Other
Payer: COMMERCIAL

## 2021-12-11 VITALS
TEMPERATURE: 98 F | HEART RATE: 82 BPM | SYSTOLIC BLOOD PRESSURE: 139 MMHG | WEIGHT: 315 LBS | BODY MASS INDEX: 58 KG/M2 | RESPIRATION RATE: 18 BRPM | OXYGEN SATURATION: 96 % | DIASTOLIC BLOOD PRESSURE: 55 MMHG

## 2021-12-11 DIAGNOSIS — R11.2 NAUSEA VOMITING AND DIARRHEA: ICD-10-CM

## 2021-12-11 DIAGNOSIS — M54.81 OCCIPITAL NEURALGIA OF RIGHT SIDE: ICD-10-CM

## 2021-12-11 DIAGNOSIS — R19.7 NAUSEA VOMITING AND DIARRHEA: ICD-10-CM

## 2021-12-11 DIAGNOSIS — G43.011 INTRACTABLE MIGRAINE WITHOUT AURA AND WITH STATUS MIGRAINOSUS: ICD-10-CM

## 2021-12-11 DIAGNOSIS — R10.11 ABDOMINAL PAIN, RIGHT UPPER QUADRANT: Primary | ICD-10-CM

## 2021-12-11 DIAGNOSIS — G44.86 CERVICOGENIC HEADACHE: ICD-10-CM

## 2021-12-11 DIAGNOSIS — R41.89 COGNITIVE CHANGES: ICD-10-CM

## 2021-12-11 PROCEDURE — 85025 COMPLETE CBC W/AUTO DIFF WBC: CPT | Performed by: EMERGENCY MEDICINE

## 2021-12-11 PROCEDURE — 99284 EMERGENCY DEPT VISIT MOD MDM: CPT

## 2021-12-11 PROCEDURE — 80076 HEPATIC FUNCTION PANEL: CPT | Performed by: EMERGENCY MEDICINE

## 2021-12-11 PROCEDURE — 81003 URINALYSIS AUTO W/O SCOPE: CPT | Performed by: EMERGENCY MEDICINE

## 2021-12-11 PROCEDURE — 83690 ASSAY OF LIPASE: CPT | Performed by: EMERGENCY MEDICINE

## 2021-12-11 PROCEDURE — 76705 ECHO EXAM OF ABDOMEN: CPT | Performed by: EMERGENCY MEDICINE

## 2021-12-11 PROCEDURE — 96361 HYDRATE IV INFUSION ADD-ON: CPT

## 2021-12-11 PROCEDURE — 80048 BASIC METABOLIC PNL TOTAL CA: CPT | Performed by: EMERGENCY MEDICINE

## 2021-12-11 PROCEDURE — 70544 MR ANGIOGRAPHY HEAD W/O DYE: CPT | Performed by: OTHER

## 2021-12-11 PROCEDURE — 96374 THER/PROPH/DIAG INJ IV PUSH: CPT

## 2021-12-11 RX ORDER — ONDANSETRON 4 MG/1
4 TABLET, ORALLY DISINTEGRATING ORAL EVERY 4 HOURS PRN
Qty: 10 TABLET | Refills: 0 | Status: SHIPPED | OUTPATIENT
Start: 2021-12-11 | End: 2021-12-18

## 2021-12-11 RX ORDER — ONDANSETRON 2 MG/ML
4 INJECTION INTRAMUSCULAR; INTRAVENOUS ONCE
Status: COMPLETED | OUTPATIENT
Start: 2021-12-11 | End: 2021-12-11

## 2021-12-11 NOTE — ED INITIAL ASSESSMENT (HPI)
Patient notes diarrhea/vomiting yesterday. Continued today. Unable to tolerate liquids   Patient notes right sided abdominal pain.    No hx of abdominal surgeries

## 2021-12-11 NOTE — ED PROVIDER NOTES
Patient Seen in: [facility] AND [facility] Emergency Department      History   Patient presents with:  Abdomen/Flank Pain    Stated Complaint: R sided abdominal pain    Subjective:   HPI    79-year-old male with history of chronic headaches, dizziness, occipital over a year ago     Vaping Use      Vaping Use: Never used    Alcohol use: Not Currently    Drug use: Yes      Types: Cannabis      Comment: CBD             Review of Systems    Positive for stated complaint: R sided abdominal pain  Other systems are as no -----------         ------                     CBC W/ DIFFERENTIAL[[id number]]                              Final result                 Please view results for these tests on the individual orders.    RAINBOW DRAW LAVENDER   RAINBOW DRAW LIGHT GREEN   CBC W

## 2021-12-12 NOTE — CM/SW NOTE
Per Cleo, called patient at [phone] regarding his question about his prescription not being available at pharmacy.     Called his [facility] at [phone] and confirmed that they did received the escript and that he was working on filling it and it

## 2021-12-16 ENCOUNTER — TELEPHONE (OUTPATIENT)
Dept: PHYSICAL MEDICINE AND REHAB | Facility: CLINIC | Age: 26
End: 2021-12-16

## 2021-12-16 DIAGNOSIS — M79.18 CERVICAL MYOFASCIAL PAIN SYNDROME: Primary | ICD-10-CM

## 2021-12-16 NOTE — TELEPHONE ENCOUNTER
----- Message from Farhana Kwok PT sent at 12/14/2021  5:54 PM CST -----  Regarding: New orders  Hi,  Thanks for your response. Yes, if you could please put a new order in Epic, that would be great.   I didn't include continuing care in my Plan on the Pr

## 2021-12-18 RX ORDER — ONDANSETRON 4 MG/1
4 TABLET, ORALLY DISINTEGRATING ORAL EVERY 8 HOURS PRN
Qty: 30 TABLET | Refills: 0 | OUTPATIENT
Start: 2021-12-18

## 2021-12-23 ENCOUNTER — TELEPHONE (OUTPATIENT)
Dept: PHYSICAL MEDICINE AND REHAB | Facility: CLINIC | Age: 26
End: 2021-12-23

## 2021-12-23 NOTE — TELEPHONE ENCOUNTER
S/w pt,   -notified him that Dr. Riri Simental placed PT order on 12/16.   -He also wanted to make sure that Dr. Riri Simental was aware that pt was in ED a few weeks ago. -Wanted to confirm that Dr. Riri Simental has been in contact w/Dr. Yulisa Che.

## 2022-01-14 RX ORDER — DULOXETIN HYDROCHLORIDE 30 MG/1
CAPSULE, DELAYED RELEASE ORAL
Qty: 90 CAPSULE | Refills: 0 | Status: SHIPPED | OUTPATIENT
Start: 2022-01-14

## 2022-01-14 NOTE — TELEPHONE ENCOUNTER
Refill request for duloxetine 30 mg, take 1 cap daily, #90, no refills    LOV: 12/2/21  NOV: None  Last refilled on 10/18/21 for 90 day supply

## 2022-01-24 ENCOUNTER — OFFICE VISIT (OUTPATIENT)
Dept: PHYSICAL THERAPY | Facility: HOSPITAL | Age: 27
End: 2022-01-24
Attending: FAMILY MEDICINE
Payer: COMMERCIAL

## 2022-01-24 PROCEDURE — 97140 MANUAL THERAPY 1/> REGIONS: CPT

## 2022-01-24 NOTE — PROGRESS NOTES
Authorized # of Visits:  Switched from [facility] to [facility] WADEO     Next MD visit:   Fall Risk: Standard      Precautions: N/A           Charges:  Man There 3  Total Timed Treatment: 40 min     Total Treatment Time: 50 min    Dx: Cervical myofascial pain syndrome 66/100 on FOTO to demonstrate return to maximum functional performance. - not met. Improved from 45 to 48/100  2.  Benitoda Angles. will report he is able to clean his home with 2/10 pain or less to improve household ADL performance - he is able to do

## 2022-01-28 ENCOUNTER — TELEPHONE (OUTPATIENT)
Dept: NEUROLOGY | Facility: CLINIC | Age: 27
End: 2022-01-28

## 2022-01-28 ENCOUNTER — TELEPHONE (OUTPATIENT)
Dept: PHYSICAL MEDICINE AND REHAB | Facility: CLINIC | Age: 27
End: 2022-01-28

## 2022-01-28 NOTE — TELEPHONE ENCOUNTER
Patient is asking for Dr. Elana Lopez to call his PCP Dr. Campos Sarabia. Dr. Eliane Rivera was supposed to have recv'd a document from our office for the patient that she have not got as of yet. Please advise with Dr. Eliane Rivera.

## 2022-01-28 NOTE — TELEPHONE ENCOUNTER
Patient called to say that   his PCP, Dr. Sis Thibodeaux has asked that  Dr. Cherelle Bergman reach out to her to discuss   Patient progress and care. Her office number is [phone]  or she could be reached via [address] Patient does have appt. with Dr. Cherelle Bergman   for 2/1.

## 2022-01-31 ENCOUNTER — APPOINTMENT (OUTPATIENT)
Dept: PHYSICAL THERAPY | Facility: HOSPITAL | Age: 27
End: 2022-01-31
Attending: FAMILY MEDICINE
Payer: COMMERCIAL

## 2022-02-01 ENCOUNTER — PATIENT MESSAGE (OUTPATIENT)
Dept: NEUROLOGY | Facility: CLINIC | Age: 27
End: 2022-02-01

## 2022-02-01 ENCOUNTER — TELEPHONE (OUTPATIENT)
Dept: PHYSICAL MEDICINE AND REHAB | Facility: CLINIC | Age: 27
End: 2022-02-01

## 2022-02-01 ENCOUNTER — OFFICE VISIT (OUTPATIENT)
Dept: NEUROLOGY | Facility: CLINIC | Age: 27
End: 2022-02-01
Payer: COMMERCIAL

## 2022-02-01 ENCOUNTER — OFFICE VISIT (OUTPATIENT)
Dept: PHYSICAL THERAPY | Facility: HOSPITAL | Age: 27
End: 2022-02-01
Attending: FAMILY MEDICINE
Payer: COMMERCIAL

## 2022-02-01 VITALS
BODY MASS INDEX: 53.78 KG/M2 | DIASTOLIC BLOOD PRESSURE: 90 MMHG | SYSTOLIC BLOOD PRESSURE: 140 MMHG | WEIGHT: 315 LBS | HEIGHT: 64 IN | HEART RATE: 84 BPM

## 2022-02-01 DIAGNOSIS — R11.0 DAILY NAUSEA: ICD-10-CM

## 2022-02-01 DIAGNOSIS — G43.111 INTRACTABLE MIGRAINE WITH AURA WITH STATUS MIGRAINOSUS: ICD-10-CM

## 2022-02-01 DIAGNOSIS — M54.81 OCCIPITAL NEURALGIA OF RIGHT SIDE: Primary | ICD-10-CM

## 2022-02-01 DIAGNOSIS — I10 HYPERTENSION, UNSPECIFIED TYPE: ICD-10-CM

## 2022-02-01 PROCEDURE — 3080F DIAST BP >= 90 MM HG: CPT | Performed by: OTHER

## 2022-02-01 PROCEDURE — 97110 THERAPEUTIC EXERCISES: CPT

## 2022-02-01 PROCEDURE — 3008F BODY MASS INDEX DOCD: CPT | Performed by: OTHER

## 2022-02-01 PROCEDURE — 97140 MANUAL THERAPY 1/> REGIONS: CPT

## 2022-02-01 PROCEDURE — 99215 OFFICE O/P EST HI 40 MIN: CPT | Performed by: OTHER

## 2022-02-01 PROCEDURE — 3077F SYST BP >= 140 MM HG: CPT | Performed by: OTHER

## 2022-02-01 RX ORDER — GABAPENTIN 100 MG/1
100 CAPSULE ORAL 3 TIMES DAILY
COMMUNITY
Start: 2022-01-11

## 2022-02-01 NOTE — TELEPHONE ENCOUNTER
From: Eliezer Duke. To: Leon Flores DO  Sent: 2/1/2022 2:53 PM CST  Subject: \"Shutting Down\"    Dr. Rivera Akhtar,     I went and saw my orthopedic surgeon. My shoulder structure is good and showing no signs of issue. I was told that he refers me back to you. For the \"shutting down\" feeling. I know we did MRI tests, blood work, and Pysch test. but is there any other tests we can do like an EEG to see how my brain functions during my episodes of \"shutting Down\"? I know [facility] is trying to get a video of it but maybe get a EEG as well to see how my brain is function when it happens? Thank You!

## 2022-02-01 NOTE — TELEPHONE ENCOUNTER
Initiated authorization for Right occipital nerve block CPT 46878+ with [employer]  Coverage is active-Transaction ID: [id number]  Status: Approved-authorization is not required per health plan    Per Dr. Aleksandar Grimm March 8, 2022 - approximately 3 months after last nerve block

## 2022-02-01 NOTE — PATIENT INSTRUCTIONS
-Schedule occipital nerve block in 3 months (March 8 approximately)   -Follow up in 3 months or sooner if needed. -If you develop sudden onset loss of vision, double vision, room spinning/world spinning sensation, inability to speak or understand others who are speaking to you, slurred speech, balance problems, weakness on one side of your body, numbness on one side of your body or worst headache you ever had, please seek out emergency medical attention via 911 for the nearest emergency room to be evaluated for possible stroke. -MyChart or call office with any questions or concerns. Thank you for coming to see me today. The easiest way to communicate with me is via PPT Reasearcht. PPT Reasearcht is meant for simple questions regarding medications, possible side effects, or other simple straight forward questions in limited sentences, rather than multiple paragraphs of discussion. PPT Reasearcht is not meant for, or efficient for these complex questions, extensive questions, extensive medication adjustments, complex new symptoms or concerns. These issues beyond simple questions require a follow up visit with myself. Refills:  Please pay attention to when your refills will need to be renewed. Due to the volume of phone calls daily, this could potentially take a few days, although we certainly try to honor your refill requests as soon as we can. You should call at least 1 week in advance of needing a refill to ensure you do not run out of medication. Keep in mind that refill requests on Fridays may not be filled until the following week.

## 2022-02-01 NOTE — TELEPHONE ENCOUNTER
From: Delphine Agarwal. To: Glo Beltran DO  Sent: 2/1/2022 2:53 PM CST  Subject: \"Shutting Down\"    Dr. Leana Weiner,     I went and saw my orthopedic surgeon. My shoulder structure is good and showing no signs of issue. I was told that he refers me back to you. For the \"shutting down\" feeling. I know we did MRI tests, blood work, and Pysch test. but is there any other tests we can do like an EEG to see how my brain functions during my episodes of \"shutting Down\"? I know [facility] is trying to get a video of it but maybe get a EEG as well to see how my brain is function when it happens? Thank You!

## 2022-02-03 NOTE — TELEPHONE ENCOUNTER
If there is a document to be signed please have the patient follow up with it, or deliver it to the office.

## 2022-02-04 NOTE — TELEPHONE ENCOUNTER
Spoke with patient states that Dr. Shikha Fowler has been trying to get in touch with our office. Informed patient that we share the same programing with charting. Patient verbalized understanding-- also made patient a f/u appt.

## 2022-02-04 NOTE — TELEPHONE ENCOUNTER
Called and left message at PCP's office to call me back to discuss.     ALDO Valentin DO  2/4/2022   11:20 AM

## 2022-02-07 ENCOUNTER — PATIENT MESSAGE (OUTPATIENT)
Dept: PHYSICAL MEDICINE AND REHAB | Facility: CLINIC | Age: 27
End: 2022-02-07

## 2022-02-07 ENCOUNTER — OFFICE VISIT (OUTPATIENT)
Dept: PHYSICAL THERAPY | Facility: HOSPITAL | Age: 27
End: 2022-02-07
Attending: FAMILY MEDICINE
Payer: COMMERCIAL

## 2022-02-07 ENCOUNTER — TELEPHONE (OUTPATIENT)
Dept: PHYSICAL MEDICINE AND REHAB | Facility: CLINIC | Age: 27
End: 2022-02-07

## 2022-02-07 PROCEDURE — 97140 MANUAL THERAPY 1/> REGIONS: CPT

## 2022-02-07 NOTE — TELEPHONE ENCOUNTER
From: Ray Chatterjee. To: Marcellus Rodrigues DO  Sent: 2/7/2022 11:21 AM CST  Subject: Ray Chatterjee. Hi Dr. Danisha Mendoza,     I just talked with the  and they wanted me to [facility]. My Vinie Hews has stated that my settlement expires on Feb 15, 2022. If any of my symptoms that you are treating me for are related to my work accident. Can you please put it in a letter. I have also reached out to Dr. Romana Chiquito office as well as my primary care Dr. Merlos a little bit of a time crunch and I need something before Feb 15. Thank you!

## 2022-02-07 NOTE — TELEPHONE ENCOUNTER
Patient called to say that his  called him to say that his Company wants to stop his Workman's comp claim. They are hoping that Dr. Cherelle Bergman could write a note saying that what he has is directly related to his work accident. They would need this as soon as possible. Please call him for more details on this. Also has questions on his EEG for tomorrow. Not clear on the sleeping schedule.

## 2022-02-07 NOTE — TELEPHONE ENCOUNTER
Pt needs a letter for work : To show his injury are work related and Dr. Grant Ospina has been treating him .   This letter has to be done by 02/15/22  Please assist .

## 2022-02-08 ENCOUNTER — HOSPITAL ENCOUNTER (OUTPATIENT)
Dept: ELECTROPHYSIOLOGY | Facility: HOSPITAL | Age: 27
Discharge: HOME | End: 2022-02-08
Attending: Other
Payer: COMMERCIAL

## 2022-02-08 PROCEDURE — 95816 EEG AWAKE AND DROWSY: CPT | Performed by: OTHER

## 2022-02-08 NOTE — TELEPHONE ENCOUNTER
Per note on 8/12/21:   States he is not sure if this is a work-related injury. 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 He states that his glove got caught in the machine and twisted his arm, causing  fractures in the forearm that need to be treated operatively. Please advise on note.

## 2022-02-08 NOTE — PROCEDURES
[facility], [address]      PATIENT'S NAME: Nova Vic   ATTENDING PHYSICIAN: Jelly Lee   PATIENT ACCOUNT #: [de-identified] LOCATION: [address] [place] [address] RECORD #: [id number] YOB: 1995   DATE OF SERVICE: 02/08/2022       ELECTROENCEPHALOGRAM REPORT    DATE OF EXAMINATION:  02/08/2022  AGE: 26 Yrs. SEX: M   EEG #:      INTERPRETATION:  Routine awake-drowsy electroencephalogram using referential and bipolar montages. Background activity is moderate amplitude 9 Hz alpha activity which is symmetric and attenuates symmetrically with eye opening. Drowsiness recorded. Hyperventilation and photic stimulation produced no abnormalities.       IMPRESSION:  Normal awake-drowsy electroencephalogram.    Dictated By Sridhar Lubin MD  d: 02/08/2022 10:22:32  t: 02/08/2022 11:40:56  [facility] [id number]  [facility]/

## 2022-02-10 ENCOUNTER — OFFICE VISIT (OUTPATIENT)
Dept: NEUROLOGY | Facility: CLINIC | Age: 27
End: 2022-02-10
Payer: COMMERCIAL

## 2022-02-10 VITALS
HEIGHT: 64 IN | BODY MASS INDEX: 53.78 KG/M2 | HEART RATE: 82 BPM | DIASTOLIC BLOOD PRESSURE: 90 MMHG | SYSTOLIC BLOOD PRESSURE: 138 MMHG | WEIGHT: 315 LBS

## 2022-02-10 DIAGNOSIS — G44.86 CERVICOGENIC HEADACHE: ICD-10-CM

## 2022-02-10 DIAGNOSIS — M79.18 CERVICAL MYOFASCIAL PAIN SYNDROME: ICD-10-CM

## 2022-02-10 DIAGNOSIS — M54.81 OCCIPITAL NEURALGIA OF RIGHT SIDE: Primary | ICD-10-CM

## 2022-02-10 DIAGNOSIS — G43.111 INTRACTABLE MIGRAINE WITH AURA WITH STATUS MIGRAINOSUS: ICD-10-CM

## 2022-02-10 DIAGNOSIS — F45.0 SOMATIZATION DISORDER: ICD-10-CM

## 2022-02-10 PROCEDURE — 3008F BODY MASS INDEX DOCD: CPT | Performed by: OTHER

## 2022-02-10 PROCEDURE — 3080F DIAST BP >= 90 MM HG: CPT | Performed by: OTHER

## 2022-02-10 PROCEDURE — 3075F SYST BP GE 130 - 139MM HG: CPT | Performed by: OTHER

## 2022-02-10 PROCEDURE — 99212 OFFICE O/P EST SF 10 MIN: CPT | Performed by: OTHER

## 2022-02-10 NOTE — TELEPHONE ENCOUNTER
The symptoms I am treating him for occurred during his rehabilitation after his surgeries. The best place for him to go would be the doctors or surgeons who saw him before his surgery, and the doctor who did his surgery.

## 2022-02-10 NOTE — PATIENT INSTRUCTIONS
-Follow up in 6 months or sooner if needed. -If you develop sudden onset loss of vision, double vision, room spinning/world spinning sensation, inability to speak or understand others who are speaking to you, slurred speech, balance problems, weakness on one side of your body, numbness on one side of your body or worst headache you ever had, please seek out emergency medical attention via 911 for the nearest emergency room to be evaluated for possible stroke. -MyChart or call office with any questions or concerns. Thank you for coming to see me today. The easiest way to communicate with me is via [employer]. [employer] is meant for simple questions regarding medications, possible side effects, or other simple straight forward questions in limited sentences, rather than multiple paragraphs of discussion. [employer] is not meant for, or efficient for these complex questions, extensive questions, extensive medication adjustments, complex new symptoms or concerns. These issues beyond simple questions require a follow up visit with myself. Refills:  Please pay attention to when your refills will need to be renewed. Due to the volume of phone calls daily, this could potentially take a few days, although we certainly try to honor your refill requests as soon as we can. You should call at least 1 week in advance of needing a refill to ensure you do not run out of medication. Keep in mind that refill requests on Fridays may not be filled until the following week.

## 2022-02-14 ENCOUNTER — APPOINTMENT (OUTPATIENT)
Dept: PHYSICAL THERAPY | Facility: HOSPITAL | Age: 27
End: 2022-02-14
Attending: FAMILY MEDICINE
Payer: COMMERCIAL

## 2022-02-18 ENCOUNTER — TELEPHONE (OUTPATIENT)
Dept: PHYSICAL THERAPY | Facility: HOSPITAL | Age: 27
End: 2022-02-18

## 2022-02-21 ENCOUNTER — OFFICE VISIT (OUTPATIENT)
Dept: PHYSICAL THERAPY | Facility: HOSPITAL | Age: 27
End: 2022-02-21
Attending: FAMILY MEDICINE
Payer: COMMERCIAL

## 2022-02-21 PROCEDURE — 97140 MANUAL THERAPY 1/> REGIONS: CPT

## 2022-02-21 NOTE — PROGRESS NOTES
ProgressSummary  Pt has attended 16 visits total in Physical Therapy; 6 visits since your last evaluation (11/22 - 2/21/2022)    Next visit with Dr. Luis A Elam: 2/24    Dx: Cervical myofascial pain syndrome (M79.18)           Subjective: Pt has reported a significant decrease in his pain. The (R) ant and lat cervical, upper thoracic, and chest pain that was previously 4-8/10 is no longer present. Pt continues to c/o localized tightness in the R suboccipital region with mild TTP. Headaches continue to improve since the nerve block. Current pain = 5/10 generalized in R UE    Objective: Pt has been seen with decreased frequency for cervical and upper thoracic soft tissue mobilization, stretching, cervical segmental mobilization,  scapular stabilization, RC strengthening, HEP progression, cervical traction. Cervical AROM = WNL and pain free  OA and AA mobility testing WNL    Strength Testing Left Right   SOC R  11/24/2021   R  2/21/2022     Trapezius (C2-4) 5/5 5/5     Shoulder Flexion (C5) 5/5 4-/5  4+/5  5/5   Shoulder Abduction  5/5 4+/5  4+/5 * in shoulder 4+/5 * in posterior shoulder   Biceps (C5) 5/5 3+/5  5/5  5/5   Triceps (C7) 5/5 3+/5  5/5  5/5   Wrist Extension (C6) 5/5 4-/5 4-/5  4-/5   Wrist Flexion  5/5 4-/5 5/5 5/5   Thumb Extension (C8) 5/5 3-/5 3-/5  NT   Finger Abduction (T1) 4/5 3-/5 3+/5  3+/5     Assessment: Pt has been gradually progressing and is chemo the HEP and rx's well, with decreased symptoms and increased strength. Plan:  Cont 1x/wk x 4 as needed for STM/MFR, traction, progressive shoulder girdle strengthening as chemo. Today's Rx:    10/27/2021   9  11/3/2021   10  11/22/2021   11  11/24/2021   12  1/24/2022   13; 1/8 2/1/2022   14; 2/8 2/7/2022   15; 3/8  2/21/2022   16; 4/8   HEP     row with blue  GH ext with retraction with yellow IR and ER with YTB     Therapeutic Exercises  active seated stretch for SCM, Scalenes.     row with green 10x2  10x1; blue 10x1 Gh ext with retraction with yellow 10x2 IR and ER with YTB 10x3     Manual  X     X  X     X  MET for OA   X     X  X     X  STM/MFR R cervical and upper thoracic seated  X    X  L suboccipital and cervical paraspinal supine  Pivotal inhibition, suboccipital release   Neuro ReEducation           Modalities  seated prior to STM    mechanical traction x 10 min  x 8  X  X 10  X     HEP to date: cerv retraction, gentle neuromob (R)UE, gentle R cervical and upper trap stretching, row with blue, IR and ER with YTB,     Authorized # of Visits:  Switched from [facility] to . Marita Mayo 150 PPO       Fall Risk: Standard      Precautions: N/A           Charges:  Man There 3 Total Timed Treatment: 40 min     Total Treatment Time: 50 min

## 2022-02-24 ENCOUNTER — TELEPHONE (OUTPATIENT)
Dept: NEUROLOGY | Facility: CLINIC | Age: 27
End: 2022-02-24

## 2022-02-24 ENCOUNTER — OFFICE VISIT (OUTPATIENT)
Dept: PHYSICAL MEDICINE AND REHAB | Facility: CLINIC | Age: 27
End: 2022-02-24
Payer: COMMERCIAL

## 2022-02-24 VITALS
WEIGHT: 315 LBS | BODY MASS INDEX: 53.78 KG/M2 | SYSTOLIC BLOOD PRESSURE: 138 MMHG | OXYGEN SATURATION: 97 % | DIASTOLIC BLOOD PRESSURE: 74 MMHG | HEART RATE: 72 BPM | HEIGHT: 64 IN

## 2022-02-24 DIAGNOSIS — M54.16 RIGHT LUMBAR RADICULITIS: Primary | ICD-10-CM

## 2022-02-24 DIAGNOSIS — M79.18 CERVICAL MYOFASCIAL PAIN SYNDROME: ICD-10-CM

## 2022-02-24 PROCEDURE — 3008F BODY MASS INDEX DOCD: CPT | Performed by: PHYSICAL MEDICINE & REHABILITATION

## 2022-02-24 PROCEDURE — 99213 OFFICE O/P EST LOW 20 MIN: CPT | Performed by: PHYSICAL MEDICINE & REHABILITATION

## 2022-02-24 PROCEDURE — 3075F SYST BP GE 130 - 139MM HG: CPT | Performed by: PHYSICAL MEDICINE & REHABILITATION

## 2022-02-24 PROCEDURE — 3078F DIAST BP <80 MM HG: CPT | Performed by: PHYSICAL MEDICINE & REHABILITATION

## 2022-02-24 NOTE — TELEPHONE ENCOUNTER
Right cervical paraspinal, upper trapezius trigger point injection CPT CODE 78921.      Status: APPROVED. Pre authorization is not required    Availity online for authorization of approval for above.  Notification or Prior Authorization is not required for the requested services  Transaction ID: [id number] ID: [id number] Date: 2022-02-24    Reference Number [id number]    Notified Approved Referrals for scheduling     Message routed to [facility] front office staff to verify pt coverage, is request under WC claim or BCBS

## 2022-02-26 NOTE — PROGRESS NOTES
Progress note    C/C: right neck pain, right lower back and leg pain    HPI: 50-year-old male presents for follow-up. Has 2 sessions left for physical therapy. Radiating pain into the right arm has stopped, though he continues to have focal neck pain. He also has been having right-sided lower back pain at and above the waistline that wraps around the groin, radiates down the leg to the dorsum of the foot. He has no provoked pain with standing and walking. There is associated numbness and tingling in the same distribution. No previous h/o lower back pain. He continues to follow with orthopedics, was diagnosed with right de Quervain's tenosynovitis and was issued a brace that he is supposed to wear for the next 6 weeks. He also continues to follow-up with neurology. He had an occipital nerve block done, and has a repeat occipital nerve block scheduled sometime in March. He also continues taking duloxetine. Pertinent allergies: No known drug allergies    Physical exam:  BMI 57.85. Blood pressure 138/74. Pulse 72. O2 sat 97%    No neck rash  Cervical extension 50 degrees. Cervical flexion 50 degrees. Cervical rotation to the right 75 degrees. Cervical rotation to the left 75 degrees  Spurling's test negative  5/5 proximal UE strength b/l  2/4 biceps, brachioradialis, triceps reflexes b/l  Miranda test negative  ttp right cervical and upper trapezius ttp. Lumbar spine exam:    mild pain with lumbar flexion. No pain with lumbar extension. mild tenderness to palpation right lower lumbar paraspinals. negative ttp PSIS. 5/5 LE strength b/leversion  SILT b/l LE  2/4 quad, gastrocs reflexes b/l  Facet loading negative b/l  SLR results in lower back pain    Imaging: No new imaging to review    Assessment and plan  1. Cervical myofascial pain syndrome, improving  2. Right lumbar radicultiis    He will complete therapy for his neck, and then start PT for neutral to extension lumbar stabilization.   If he continues to have neck pain upon completion of the last 2 sessions consider right cervical paraspinal and upper trapezius trigger point injections.     Maxine Mckeon DO  Physical Medicine and [address] [place] [address]

## 2022-02-28 ENCOUNTER — TELEPHONE (OUTPATIENT)
Dept: NEUROLOGY | Facility: CLINIC | Age: 27
End: 2022-02-28

## 2022-02-28 ENCOUNTER — PATIENT MESSAGE (OUTPATIENT)
Dept: PHYSICAL MEDICINE AND REHAB | Facility: CLINIC | Age: 27
End: 2022-02-28

## 2022-02-28 ENCOUNTER — OFFICE VISIT (OUTPATIENT)
Dept: PHYSICAL THERAPY | Facility: HOSPITAL | Age: 27
End: 2022-02-28
Attending: FAMILY MEDICINE
Payer: COMMERCIAL

## 2022-02-28 PROCEDURE — 97140 MANUAL THERAPY 1/> REGIONS: CPT

## 2022-02-28 PROCEDURE — 97110 THERAPEUTIC EXERCISES: CPT

## 2022-02-28 NOTE — TELEPHONE ENCOUNTER
Received neuropsych report -- done with Dr. Rowan Carney on 1/6/22. Placed on Dr. Garcia Cart desk for review.

## 2022-02-28 NOTE — TELEPHONE ENCOUNTER
From: Melvin Patiño. To: Leoindas Wright DO  Sent: 2/28/2022 9:26 AM CST  Subject: Appointment change    Hi, Dr. Kari Ring    I have scheduled my therapy for my lumber radiculitis. one issue is on March 24. I have therapy scheduled for 2pm that last 45mins. but I have my trigger point injection the same day at 2:30. will I be able to come a little late to the injections so I can keep both therapy and my trigger point scheduled? please let me know if this will be an issue and I will reschedule the injections. thank you!

## 2022-02-28 NOTE — TELEPHONE ENCOUNTER
Patient dropped off Neuropsych results-put in   [facility]

## 2022-02-28 NOTE — PROGRESS NOTES
Dx: Cervical myofascial pain syndrome (M79.18)           Subjective: Pt states he saw Dr. Brando Tadeo and is to finish off 2 more sessions of PT for his neck, have trigger point injections, and then begin PT with Reesa Payment for his lumbar spine. Pt states he feels symptoms he has been seen for are now at a manageable level and not interfering with ADL's. Objective: Rx: see flow chart  Increased resting tone and mild TTP persists in the R post/lat cervical and upper trap musculature. Strength Testing Left Right   SOC R  11/24/2021   R  2/21/2022     Trapezius (C2-4) 5/5 5/5     Shoulder Flexion (C5) 5/5 4-/5  4+/5  5/5   Shoulder Abduction  5/5 4+/5  4+/5 * in shoulder 4+/5 * in posterior shoulder   Biceps (C5) 5/5 3+/5  5/5  5/5   Triceps (C7) 5/5 3+/5  5/5  5/5   Wrist Extension (C6) 5/5 4-/5 4-/5  4-/5   Wrist Flexion  5/5 4-/5 5/5 5/5   Thumb Extension (C8) 5/5 3-/5 3-/5  NT   Finger Abduction (T1) 4/5 3-/5 3+/5  3+/5     Assessment: Pt has been gradually progressing and is chemo the HEP and rx's well, with decreased symptoms and increased strength. Plan:  Cont 1x/wk x 4 as needed for STM/MFR, traction, progressive shoulder girdle strengthening as chemo.   1    Today's Rx:    11/22/2021   11  11/24/2021   12  1/24/2022   13; 1/8 2/1/2022   14; 2/8 2/7/2022   15; 3/8  2/21/2022   16; 4/8 2/28/2022   17, 5/8   HEP   row with blue  GH ext with retraction with yellow IR and ER with YTB   IR and ER to red as chemo  [address] ext with band with retraction   Therapeutic Exercises  row with green 10x2  10x1; blue 10x1 Gh ext with retraction with yellow 10x2 IR and ER with YTB 10x3   IR and ER with red band - 10x1  GH ext with yellow TB 10x2  Row with blue x 10   Manual  X     X  X     X  STM/MFR R cervical and upper thoracic seated  X    X  R suboccipital and cervical paraspinal supine  Pivotal inhibition, suboccipital release  X seated   Neuro ReEducation          Modalities   mechanical traction x 10 min  x 8  X  X 10  X  X HEP to date: cerv retraction, gentle neuromob (R)UE, gentle R cervical and upper trap stretching, row with blue, IR and ER with YTB or RTB as chemo, GH ext with yellow  Authorized # of Visits:  Switched from [facility] to [facility]       Fall Risk: Standard      Precautions: N/A           Charges:  Man There 2, There Ex 1 Total Timed Treatment: 40 min     Total Treatment Time: 50 min

## 2022-03-07 ENCOUNTER — OFFICE VISIT (OUTPATIENT)
Dept: GASTROENTEROLOGY | Facility: CLINIC | Age: 27
End: 2022-03-07
Payer: COMMERCIAL

## 2022-03-07 ENCOUNTER — OFFICE VISIT (OUTPATIENT)
Dept: PHYSICAL THERAPY | Facility: HOSPITAL | Age: 27
End: 2022-03-07
Attending: FAMILY MEDICINE
Payer: COMMERCIAL

## 2022-03-07 VITALS
HEIGHT: 64 IN | BODY MASS INDEX: 53.78 KG/M2 | DIASTOLIC BLOOD PRESSURE: 89 MMHG | HEART RATE: 68 BPM | SYSTOLIC BLOOD PRESSURE: 140 MMHG | WEIGHT: 315 LBS

## 2022-03-07 DIAGNOSIS — R11.2 NAUSEA AND VOMITING, UNSPECIFIED VOMITING TYPE: Primary | ICD-10-CM

## 2022-03-07 DIAGNOSIS — K21.9 GASTROESOPHAGEAL REFLUX DISEASE, UNSPECIFIED WHETHER ESOPHAGITIS PRESENT: ICD-10-CM

## 2022-03-07 PROCEDURE — 97140 MANUAL THERAPY 1/> REGIONS: CPT

## 2022-03-07 PROCEDURE — 3077F SYST BP >= 140 MM HG: CPT | Performed by: NURSE PRACTITIONER

## 2022-03-07 PROCEDURE — 3079F DIAST BP 80-89 MM HG: CPT | Performed by: NURSE PRACTITIONER

## 2022-03-07 PROCEDURE — 3008F BODY MASS INDEX DOCD: CPT | Performed by: NURSE PRACTITIONER

## 2022-03-07 PROCEDURE — 99243 OFF/OP CNSLTJ NEW/EST LOW 30: CPT | Performed by: NURSE PRACTITIONER

## 2022-03-07 NOTE — PATIENT INSTRUCTIONS
-Increase omeprazole to 40 mg twice daily  -Stop cannabis  -Follow-up in 2-3 months  -Continue follow-up with neurology

## 2022-03-15 ENCOUNTER — OFFICE VISIT (OUTPATIENT)
Dept: PHYSICAL THERAPY | Facility: HOSPITAL | Age: 27
End: 2022-03-15
Attending: PHYSICAL MEDICINE & REHABILITATION
Payer: COMMERCIAL

## 2022-03-15 DIAGNOSIS — M54.16 RIGHT LUMBAR RADICULITIS: ICD-10-CM

## 2022-03-15 PROCEDURE — 97112 NEUROMUSCULAR REEDUCATION: CPT

## 2022-03-15 PROCEDURE — 97162 PT EVAL MOD COMPLEX 30 MIN: CPT

## 2022-03-15 PROCEDURE — 97110 THERAPEUTIC EXERCISES: CPT

## 2022-03-17 ENCOUNTER — OFFICE VISIT (OUTPATIENT)
Dept: PHYSICAL THERAPY | Facility: HOSPITAL | Age: 27
End: 2022-03-17
Attending: PHYSICAL MEDICINE & REHABILITATION
Payer: COMMERCIAL

## 2022-03-17 PROCEDURE — 97110 THERAPEUTIC EXERCISES: CPT

## 2022-03-17 PROCEDURE — 97112 NEUROMUSCULAR REEDUCATION: CPT

## 2022-03-21 ENCOUNTER — OFFICE VISIT (OUTPATIENT)
Dept: PHYSICAL THERAPY | Facility: HOSPITAL | Age: 27
End: 2022-03-21
Attending: PHYSICAL MEDICINE & REHABILITATION
Payer: COMMERCIAL

## 2022-03-21 PROCEDURE — 97112 NEUROMUSCULAR REEDUCATION: CPT

## 2022-03-21 PROCEDURE — 97110 THERAPEUTIC EXERCISES: CPT

## 2022-03-24 ENCOUNTER — OFFICE VISIT (OUTPATIENT)
Dept: PHYSICAL THERAPY | Facility: HOSPITAL | Age: 27
End: 2022-03-24
Attending: PHYSICAL MEDICINE & REHABILITATION
Payer: COMMERCIAL

## 2022-03-24 ENCOUNTER — OFFICE VISIT (OUTPATIENT)
Dept: PHYSICAL MEDICINE AND REHAB | Facility: CLINIC | Age: 27
End: 2022-03-24
Payer: COMMERCIAL

## 2022-03-24 DIAGNOSIS — M79.18 MYOFASCIAL PAIN: Primary | ICD-10-CM

## 2022-03-24 PROCEDURE — 97112 NEUROMUSCULAR REEDUCATION: CPT

## 2022-03-24 PROCEDURE — 97110 THERAPEUTIC EXERCISES: CPT

## 2022-03-24 PROCEDURE — 20552 NJX 1/MLT TRIGGER POINT 1/2: CPT | Performed by: PHYSICAL MEDICINE & REHABILITATION

## 2022-03-24 PROCEDURE — 97014 ELECTRIC STIMULATION THERAPY: CPT

## 2022-03-24 RX ORDER — TRIAMCINOLONE ACETONIDE 40 MG/ML
20 INJECTION, SUSPENSION INTRA-ARTICULAR; INTRAMUSCULAR ONCE
Status: COMPLETED | OUTPATIENT
Start: 2022-03-24 | End: 2022-03-24

## 2022-03-24 RX ORDER — LIDOCAINE HYDROCHLORIDE 10 MG/ML
6.5 INJECTION, SOLUTION INFILTRATION; PERINEURAL ONCE
Status: COMPLETED | OUTPATIENT
Start: 2022-03-24 | End: 2022-03-24

## 2022-03-24 NOTE — PROCEDURES
Procedure note: Trigger point injections  Procedure Diagnosis: Myofascial pain    Summary of Procedure:   Risks and benefits of the procedure were discussed with the patient and consent was obtained. Trigger points were palpated and marked along the right cervical paraspinals and upper trapezius. Using betadine swabs, injection sites were cleaned in sterile fashion. Using a 27 gauge 1 1/2\" needle 20mg Kenolog diluted into 6.5cc of 1% Lidocaine was injected into the trigger points with 1-2 ml into each trigger point. There was negative aspiration of heme and no paresthesias were elicited. Patient tolerated the procedure well.      Joaquín Labrum DO  Physical Medicine and [address] [facility] [address]

## 2022-03-28 ENCOUNTER — OFFICE VISIT (OUTPATIENT)
Dept: PHYSICAL THERAPY | Facility: HOSPITAL | Age: 27
End: 2022-03-28
Attending: PHYSICAL MEDICINE & REHABILITATION
Payer: COMMERCIAL

## 2022-03-28 ENCOUNTER — PATIENT MESSAGE (OUTPATIENT)
Dept: GASTROENTEROLOGY | Facility: CLINIC | Age: 27
End: 2022-03-28

## 2022-03-28 ENCOUNTER — TELEPHONE (OUTPATIENT)
Dept: GASTROENTEROLOGY | Facility: CLINIC | Age: 27
End: 2022-03-28

## 2022-03-28 PROCEDURE — 97112 NEUROMUSCULAR REEDUCATION: CPT

## 2022-03-28 PROCEDURE — 97110 THERAPEUTIC EXERCISES: CPT

## 2022-03-28 RX ORDER — OMEPRAZOLE 40 MG/1
40 CAPSULE, DELAYED RELEASE ORAL 2 TIMES DAILY
Qty: 60 CAPSULE | Refills: 3 | Status: SHIPPED | OUTPATIENT
Start: 2022-03-28 | End: 2022-04-27

## 2022-03-28 NOTE — TELEPHONE ENCOUNTER
Trice Lunsford-    Please see patient's message below. I pended order for Omeprazole 40 mg BID below to be signed off per LOV notes 3-7-22. Thank you!

## 2022-03-28 NOTE — TELEPHONE ENCOUNTER
From: Gloria Rivers. To: ANDI Mon  Sent: 3/28/2022 9:55 AM CDT  Subject: Omeprazole Prescription     Hi Dr Dena Arevalo     Can you please fill a prescription for me for the omeprazole? Over the counter is getting expensive and also my insurance will cover it. Do I need to make an appointment with you to fill the prescription? Please let me know    Thank You!

## 2022-03-29 NOTE — TELEPHONE ENCOUNTER
Nursing: Per my office visit note from earlier this month, we increased omeprazole to 40 mg twice daily. Thank you!

## 2022-03-29 NOTE — TELEPHONE ENCOUNTER
Orville Peralta-    Please advise:    Pharmacy is calling to clarify the sig for Omeprazole 40 mg. Please clarify if you would like order regarding once daily before breakfast vs. BID. Thank you!

## 2022-03-30 NOTE — TELEPHONE ENCOUNTER
Contacted [facility] to clarify med sig. Spoke with Anne Marie Palmer. Confirmed patient name,  & medication. Informed rx is currently ready for him with $0 copay. Verified filled for correct dosage/frequency as illustrated per aprn below. No further questions/concerns at this time. Patient reports lab work was done at [facility]. Will call for results to be faxed. Patient was given order for CMP incase results are unavailable.  Patient denies bleeding and is tolerating well.   Continue current dose

## 2022-03-31 ENCOUNTER — TELEPHONE (OUTPATIENT)
Dept: GASTROENTEROLOGY | Facility: CLINIC | Age: 27
End: 2022-03-31

## 2022-03-31 NOTE — TELEPHONE ENCOUNTER
The below issue was addressed in alternate TE dated 3-30-22 as well. I called patient's Pharmacy to provide clarification again.

## 2022-04-05 ENCOUNTER — OFFICE VISIT (OUTPATIENT)
Dept: PHYSICAL THERAPY | Facility: HOSPITAL | Age: 27
End: 2022-04-05
Attending: PHYSICAL MEDICINE & REHABILITATION
Payer: COMMERCIAL

## 2022-04-05 PROCEDURE — 97112 NEUROMUSCULAR REEDUCATION: CPT

## 2022-04-05 PROCEDURE — 97140 MANUAL THERAPY 1/> REGIONS: CPT

## 2022-04-08 ENCOUNTER — OFFICE VISIT (OUTPATIENT)
Dept: PHYSICAL THERAPY | Facility: HOSPITAL | Age: 27
End: 2022-04-08
Attending: PHYSICAL MEDICINE & REHABILITATION
Payer: COMMERCIAL

## 2022-04-08 PROCEDURE — 97530 THERAPEUTIC ACTIVITIES: CPT

## 2022-04-08 PROCEDURE — 97112 NEUROMUSCULAR REEDUCATION: CPT

## 2022-04-11 ENCOUNTER — PATIENT MESSAGE (OUTPATIENT)
Dept: NEUROLOGY | Facility: CLINIC | Age: 27
End: 2022-04-11

## 2022-04-11 ENCOUNTER — OFFICE VISIT (OUTPATIENT)
Dept: PHYSICAL THERAPY | Facility: HOSPITAL | Age: 27
End: 2022-04-11
Attending: PHYSICAL MEDICINE & REHABILITATION
Payer: COMMERCIAL

## 2022-04-11 ENCOUNTER — PATIENT MESSAGE (OUTPATIENT)
Dept: PHYSICAL MEDICINE AND REHAB | Facility: CLINIC | Age: 27
End: 2022-04-11

## 2022-04-11 PROCEDURE — 97110 THERAPEUTIC EXERCISES: CPT

## 2022-04-11 PROCEDURE — 97112 NEUROMUSCULAR REEDUCATION: CPT

## 2022-04-12 ENCOUNTER — PATIENT MESSAGE (OUTPATIENT)
Dept: PHYSICAL MEDICINE AND REHAB | Facility: CLINIC | Age: 27
End: 2022-04-12

## 2022-04-12 ENCOUNTER — TELEPHONE (OUTPATIENT)
Dept: PHYSICAL MEDICINE AND REHAB | Facility: CLINIC | Age: 27
End: 2022-04-12

## 2022-04-12 RX ORDER — DULOXETIN HYDROCHLORIDE 30 MG/1
30 CAPSULE, DELAYED RELEASE ORAL DAILY
Qty: 90 CAPSULE | Refills: 0 | Status: SHIPPED | OUTPATIENT
Start: 2022-04-12

## 2022-04-12 NOTE — TELEPHONE ENCOUNTER
Initiated authorization for L-Spine MRI CPT 95757 to be done at [facility] with AIM online  Status: Approved with order #[id number] valid 4/12/22-6/10/22    Patient notified via [employer]

## 2022-04-12 NOTE — TELEPHONE ENCOUNTER
DULOXETINE 30 MG Oral Cap DR Particles  TAKE 1 CAPSULE(30 MG) BY MOUTH DAILY  #90, no refills    LOV: 2/10/22  NOV: None scheduled   Last refilled: 1/14/22

## 2022-04-12 NOTE — TELEPHONE ENCOUNTER
From: Maribeth Gosselin.   To: Boni John DO  Sent: 4/11/2022 9:20 PM CDT  Subject: Refill     Dr Yulisa Che,     Can you please put I a refill for my delexotine, please let me know if I have to make an appointment with you first?

## 2022-04-12 NOTE — TELEPHONE ENCOUNTER
From: Eliezer Duke. To: Payal Kumar DO  Sent: 4/11/2022 9:25 PM CDT  Subject: Injection    Dr. Saul Magana at therapy had mentioned to me to make an appointment to talk about an injection in my lower back. Please let me know if I should make the appointment.

## 2022-04-12 NOTE — TELEPHONE ENCOUNTER
If he is still having back pain despite the physical therapy recommend an MRI of the lumbar spine, and then he follows up for reassessment and to discuss results.

## 2022-04-12 NOTE — TELEPHONE ENCOUNTER
From: Olya Flores. Sent: 4/12/2022 4:03 PM CDT  To: Quita Cassette  Subject: Injection    Yes, please order the MRI.

## 2022-04-12 NOTE — TELEPHONE ENCOUNTER
From: Anthony Elliott. To: Marge Benedict DO  Sent: 4/11/2022 9:25 PM CDT  Subject: Injection    Dr. Corcoran Speaker at therapy had mentioned to me to make an appointment to talk about an injection in my lower back. Please let me know if I should make the appointment.

## 2022-04-13 ENCOUNTER — TELEPHONE (OUTPATIENT)
Dept: PHYSICAL MEDICINE AND REHAB | Facility: CLINIC | Age: 27
End: 2022-04-13

## 2022-04-18 NOTE — TELEPHONE ENCOUNTER
S/w patient states he went to scratch is lower leg last week and while doing so he felt a sharp stabbing pain in R side lower back. States he ended up taking a Norco and pain has subsided. Rates pain at constant 4/10 since then. Patient has MRI scheduled 4/19/2022 and f/u to discuss results 4/26/2022. Pt states he is currently slowly working to return to normal workload, also states he has trouble sleeping on his back and on his L side. Feels most comfortable when he lays on his R side.

## 2022-04-19 ENCOUNTER — TELEPHONE (OUTPATIENT)
Dept: NEUROLOGY | Facility: CLINIC | Age: 27
End: 2022-04-19

## 2022-04-19 ENCOUNTER — HOSPITAL ENCOUNTER (OUTPATIENT)
Dept: MRI IMAGING | Age: 27
Discharge: HOME | End: 2022-04-19
Attending: PHYSICAL MEDICINE & REHABILITATION
Payer: COMMERCIAL

## 2022-04-19 ENCOUNTER — OFFICE VISIT (OUTPATIENT)
Dept: PHYSICAL THERAPY | Facility: HOSPITAL | Age: 27
End: 2022-04-19
Attending: PHYSICAL MEDICINE & REHABILITATION
Payer: COMMERCIAL

## 2022-04-19 DIAGNOSIS — M54.16 RIGHT LUMBAR RADICULITIS: ICD-10-CM

## 2022-04-19 PROCEDURE — 72148 MRI LUMBAR SPINE W/O DYE: CPT | Performed by: PHYSICAL MEDICINE & REHABILITATION

## 2022-04-19 PROCEDURE — 97110 THERAPEUTIC EXERCISES: CPT

## 2022-04-19 PROCEDURE — 97112 NEUROMUSCULAR REEDUCATION: CPT

## 2022-04-19 NOTE — TELEPHONE ENCOUNTER
Noted; if the spasms have eased continue current management; if the spasms are still occurring he should take a muscle relaxant after work or at night time as needed, or I can prescribe him tizanidine 2mg at bedtime PRN muscle tightness/spasms, #30.

## 2022-04-19 NOTE — TELEPHONE ENCOUNTER
----- Message from Jose Farmer sent at 4/19/2022  9:59 AM CDT -----    ----- Message -----  From: Evangelina Soni DO  Sent: 4/19/2022   9:55 AM CDT  To: Governamish Mahmood Nurse    MRI lumbar spine reviewed;mild disc bulges at two levels of the lower back. Follow up to discuss treatment.

## 2022-04-26 ENCOUNTER — OFFICE VISIT (OUTPATIENT)
Dept: PHYSICAL THERAPY | Facility: HOSPITAL | Age: 27
End: 2022-04-26
Attending: PHYSICAL MEDICINE & REHABILITATION
Payer: COMMERCIAL

## 2022-04-26 ENCOUNTER — OFFICE VISIT (OUTPATIENT)
Dept: PHYSICAL MEDICINE AND REHAB | Facility: CLINIC | Age: 27
End: 2022-04-26
Payer: COMMERCIAL

## 2022-04-26 ENCOUNTER — TELEPHONE (OUTPATIENT)
Dept: NEUROLOGY | Facility: CLINIC | Age: 27
End: 2022-04-26

## 2022-04-26 VITALS
BODY MASS INDEX: 53.78 KG/M2 | SYSTOLIC BLOOD PRESSURE: 138 MMHG | DIASTOLIC BLOOD PRESSURE: 88 MMHG | WEIGHT: 315 LBS | HEIGHT: 64 IN

## 2022-04-26 DIAGNOSIS — M54.16 RIGHT LUMBAR RADICULITIS: Primary | ICD-10-CM

## 2022-04-26 PROCEDURE — 97110 THERAPEUTIC EXERCISES: CPT

## 2022-04-26 PROCEDURE — 3075F SYST BP GE 130 - 139MM HG: CPT | Performed by: PHYSICAL MEDICINE & REHABILITATION

## 2022-04-26 PROCEDURE — 3079F DIAST BP 80-89 MM HG: CPT | Performed by: PHYSICAL MEDICINE & REHABILITATION

## 2022-04-26 PROCEDURE — 99214 OFFICE O/P EST MOD 30 MIN: CPT | Performed by: PHYSICAL MEDICINE & REHABILITATION

## 2022-04-26 PROCEDURE — 3008F BODY MASS INDEX DOCD: CPT | Performed by: PHYSICAL MEDICINE & REHABILITATION

## 2022-04-26 NOTE — TELEPHONE ENCOUNTER
Patient has been scheduled for Right L4 transforaminal epidural steroid injection on 5/16/22 at the [facility] with . -Anesthesia type: LOCAL. -If receiving MAC or IVC sedation patient will need to get COVID tested 3 days prior even if already vaccinated (order placed by [facility].)  -If scheduling [address] [facility] covid testing required for all procedures whether patient is vaccinated or not. -Patient informed not to eat or drink anything after midnight the night prior to the procedure, if being sedated. -Patient was advised that if he/she does receive the covid vaccine it needs to be at least 2 weeks before or after the injection. -Medications and allergies reviewed. -Patient reminded to hold NSAIDs (Ibuprofen, ASA 81, Aleve, Naproxen, Mobic etc.) for 3 days prior to [place]  if BMI is greater than 35. For Cervical injections only hold multivitamins, Vitamin E, Fish Oil, Phentermine (Lomaira) for 7 days prior to injection and NSAIDS.  mg to be held for 7 days prior to injections.  -If patient is receiving MAC/IVCS Phentermine Juliana Or) will need to be held for 7 days prior to injection.  -If on blood thinner clearance has been received to hold this medication by provider.   -Patient informed he/she will need a  to and from procedure. -[facility] is located in the [facility] 1st floor. Patient may park in the yellow parking. Patient verbalized understanding and agrees with plan.  -----> Scheduled in Epic: Yes  -----> Scheduled in [employer]:  Yes

## 2022-04-26 NOTE — TELEPHONE ENCOUNTER
[employer] Online for authorization of approval for Right L4 transforaminal epidural steroid injection under fluoroscopy cpt codes 80270-PC, 59138. Authorization # [id number] valid 05/16/2022 - 06/14/2022. Will inform Nursing.

## 2022-04-29 ENCOUNTER — OFFICE VISIT (OUTPATIENT)
Dept: PHYSICAL THERAPY | Facility: HOSPITAL | Age: 27
End: 2022-04-29
Attending: PHYSICAL MEDICINE & REHABILITATION
Payer: COMMERCIAL

## 2022-04-29 PROCEDURE — 97110 THERAPEUTIC EXERCISES: CPT

## 2022-04-29 PROCEDURE — 97140 MANUAL THERAPY 1/> REGIONS: CPT

## 2022-04-29 PROCEDURE — 97112 NEUROMUSCULAR REEDUCATION: CPT

## 2022-05-03 ENCOUNTER — APPOINTMENT (OUTPATIENT)
Dept: PHYSICAL THERAPY | Facility: HOSPITAL | Age: 27
End: 2022-05-03
Attending: PHYSICAL MEDICINE & REHABILITATION
Payer: COMMERCIAL

## 2022-05-03 ENCOUNTER — TELEPHONE (OUTPATIENT)
Dept: PHYSICAL THERAPY | Facility: HOSPITAL | Age: 27
End: 2022-05-03

## 2022-05-10 ENCOUNTER — OFFICE VISIT (OUTPATIENT)
Dept: PHYSICAL THERAPY | Facility: HOSPITAL | Age: 27
End: 2022-05-10
Attending: FAMILY MEDICINE
Payer: COMMERCIAL

## 2022-05-10 PROCEDURE — 97112 NEUROMUSCULAR REEDUCATION: CPT

## 2022-05-10 PROCEDURE — 97110 THERAPEUTIC EXERCISES: CPT

## 2022-05-17 ENCOUNTER — OFFICE VISIT (OUTPATIENT)
Dept: PHYSICAL THERAPY | Facility: HOSPITAL | Age: 27
End: 2022-05-17
Attending: PHYSICAL MEDICINE & REHABILITATION
Payer: COMMERCIAL

## 2022-05-17 PROCEDURE — 97110 THERAPEUTIC EXERCISES: CPT | Performed by: PHYSICAL THERAPIST

## 2022-05-17 PROCEDURE — 97112 NEUROMUSCULAR REEDUCATION: CPT | Performed by: PHYSICAL THERAPIST

## 2022-05-23 ENCOUNTER — OFFICE VISIT (OUTPATIENT)
Dept: PHYSICAL THERAPY | Facility: HOSPITAL | Age: 27
End: 2022-05-23
Attending: PHYSICAL MEDICINE & REHABILITATION
Payer: COMMERCIAL

## 2022-05-23 ENCOUNTER — TELEPHONE (OUTPATIENT)
Dept: PHYSICAL THERAPY | Facility: HOSPITAL | Age: 27
End: 2022-05-23

## 2022-05-23 PROCEDURE — 97112 NEUROMUSCULAR REEDUCATION: CPT | Performed by: PHYSICAL THERAPIST

## 2022-06-09 ENCOUNTER — PATIENT MESSAGE (OUTPATIENT)
Dept: GASTROENTEROLOGY | Facility: CLINIC | Age: 27
End: 2022-06-09

## 2022-06-14 NOTE — TELEPHONE ENCOUNTER
Moraima Best-    Pt LOV on 3-7-22. Please see pt message below and advise on next steps/recommendations.

## 2022-06-14 NOTE — TELEPHONE ENCOUNTER
From: Will Braun. To: ANDI Henry  Sent: 6/9/2022 3:15 PM CDT  Subject: Upper abdomen pain    Hi Dr. Lou Walker,     The past few day I have been getting sharp shooting pain in my upper right abdomen/back  When it happens I cannot get comfortable, and it hurts when breathing, followed with some nausea     It is not constant and starts in the morning and gets worse throughout the day. but it has been happening now daily for a few days and seeing no change from different foods. I have been doing PT where I am being taught how to use the proper abb muscles when lifting and bending at work. but this does not feel like muscle tightness/soreness. ..    looking for some advice and what steps to take to relieve my pain.

## 2022-06-14 NOTE — TELEPHONE ENCOUNTER
Nursing: From what the patient is describing, it does not sound GI in etiology. Often times a shooting type of pain could be neurological in origin. However if the patient is having acute GI concerns that are ongoing, then I would recommend a follow-up office visit to reevaluate in person.

## 2022-06-21 ENCOUNTER — APPOINTMENT (OUTPATIENT)
Dept: PHYSICAL THERAPY | Facility: HOSPITAL | Age: 27
End: 2022-06-21
Attending: PHYSICAL MEDICINE & REHABILITATION
Payer: COMMERCIAL

## 2022-06-24 ENCOUNTER — APPOINTMENT (OUTPATIENT)
Dept: PHYSICAL THERAPY | Facility: HOSPITAL | Age: 27
End: 2022-06-24
Attending: PHYSICAL MEDICINE & REHABILITATION
Payer: COMMERCIAL

## 2022-06-27 RX ORDER — OMEPRAZOLE 40 MG/1
CAPSULE, DELAYED RELEASE ORAL
Qty: 180 CAPSULE | Refills: 0 | Status: SHIPPED | OUTPATIENT
Start: 2022-06-27

## 2022-07-12 ENCOUNTER — APPOINTMENT (OUTPATIENT)
Dept: PHYSICAL THERAPY | Facility: HOSPITAL | Age: 27
End: 2022-07-12
Attending: PHYSICAL MEDICINE & REHABILITATION
Payer: COMMERCIAL

## 2022-07-14 ENCOUNTER — APPOINTMENT (OUTPATIENT)
Dept: PHYSICAL THERAPY | Facility: HOSPITAL | Age: 27
End: 2022-07-14
Attending: PHYSICAL MEDICINE & REHABILITATION
Payer: COMMERCIAL

## 2022-07-19 ENCOUNTER — APPOINTMENT (OUTPATIENT)
Dept: PHYSICAL THERAPY | Facility: HOSPITAL | Age: 27
End: 2022-07-19
Attending: PHYSICAL MEDICINE & REHABILITATION
Payer: COMMERCIAL

## 2022-07-21 ENCOUNTER — APPOINTMENT (OUTPATIENT)
Dept: PHYSICAL THERAPY | Facility: HOSPITAL | Age: 27
End: 2022-07-21
Attending: PHYSICAL MEDICINE & REHABILITATION
Payer: COMMERCIAL

## 2024-05-19 NOTE — PROGRESS NOTES
Collin here today for Migraine cocktail. He states that he has had a headache for a few weeks up to this point. He is seeing a neurologist and is having a nerve block and MRV this week as well.   Collin received  Saline bolus, Mag rider, Benadryl IV, Compazin There are no Wet Read(s) to document.

## 2024-12-05 ENCOUNTER — OFFICE VISIT (OUTPATIENT)
Dept: OTOLARYNGOLOGY | Facility: CLINIC | Age: 29
End: 2024-12-05
Payer: COMMERCIAL

## 2024-12-05 VITALS — BODY MASS INDEX: 49 KG/M2 | WEIGHT: 283 LBS

## 2024-12-05 DIAGNOSIS — H92.22 BLOOD IN EAR CANAL, LEFT: ICD-10-CM

## 2024-12-05 DIAGNOSIS — H66.90 ACUTE OTITIS MEDIA, UNSPECIFIED OTITIS MEDIA TYPE: Primary | ICD-10-CM

## 2024-12-05 DIAGNOSIS — H69.90 EUSTACHIAN TUBE DISORDER, UNSPECIFIED LATERALITY: ICD-10-CM

## 2024-12-05 PROCEDURE — 92504 EAR MICROSCOPY EXAMINATION: CPT | Performed by: STUDENT IN AN ORGANIZED HEALTH CARE EDUCATION/TRAINING PROGRAM

## 2024-12-05 PROCEDURE — 99203 OFFICE O/P NEW LOW 30 MIN: CPT | Performed by: STUDENT IN AN ORGANIZED HEALTH CARE EDUCATION/TRAINING PROGRAM

## 2024-12-05 RX ORDER — OFLOXACIN 3 MG/ML
5 SOLUTION AURICULAR (OTIC) 2 TIMES DAILY
Qty: 1 EACH | Refills: 1 | Status: SHIPPED | OUTPATIENT
Start: 2024-12-05 | End: 2024-12-12

## 2024-12-05 NOTE — PROGRESS NOTES
Diego Rodriguez . is a 29 year old male.   Chief Complaint   Patient presents with    Ear Problem     Patient is here for ear tube concerns reports bleeding from her left ear reports ear infection.     HPI:   29-year-old with chronic eustachian tube dysfunction with waking up with blood from his left ear canal.  History of ear tube    Current Outpatient Medications   Medication Sig Dispense Refill    ofloxacin 0.3 % Otic Solution Place 5 drops into the left ear 2 (two) times daily for 7 days. 1 each 1    amoxicillin clavulanate 875-125 MG Oral Tab Take 1 tablet by mouth every 12 (twelve) hours for 7 days. 14 tablet 0    OMEPRAZOLE 40 MG Oral Capsule Delayed Release TAKE ONE CAPSULE BY MOUTH TWICE DAILY BEFORE MEALS 180 capsule 0    tiZANidine 2 MG Oral Tab Take 1 tablet (2 mg total) by mouth nightly as needed. For muscle tightness/ spasm as needed.. 30 tablet 0    DULoxetine 30 MG Oral Cap DR Particles Take 1 capsule (30 mg total) by mouth daily. 90 capsule 0    gabapentin 100 MG Oral Cap Take 1 capsule (100 mg total) by mouth 3 (three) times daily.      Rizatriptan Benzoate 10 MG Oral Tab use at onset; may repeat once after 2 hours- ONLY 2 IN 24 HOUR PERIOD MAX.  This is a 30 day supply. 12 tablet 0    tobramycin-dexamethasone 0.3-0.1 % Ophthalmic Suspension Place 3 drops in ear(s) every 8 (eight) hours. 10 mL 1    ondansetron (ZOFRAN ODT) 4 MG Oral Tablet Dispersible Take 1 tablet (4 mg total) by mouth every 8 (eight) hours as needed for Nausea. 30 tablet 0    acetaminophen 325 MG Oral Tab Take 1 tablet (325 mg total) by mouth every 6 (six) hours as needed.      ibuprofen 200 MG Oral Tab Take 3 tablets (600 mg total) by mouth every 6 (six) hours as needed for Pain.        Past Medical History:    History of stomach ulcers    Myopia of both eyes with regular astigmatism    Obesity    Ptosis of right upper eyelid    Visual impairment    wears glasses      Social History:  Social History     Socioeconomic  History    Marital status: Single   Tobacco Use    Smoking status: Former     Current packs/day: 0.00     Average packs/day: 0.5 packs/day for 5.0 years (2.5 ttl pk-yrs)     Types: Cigarettes     Start date:      Quit date: 2019     Years since quittin.9    Smokeless tobacco: Never    Tobacco comments:     quit over a year ago    Vaping Use    Vaping status: Never Used   Substance and Sexual Activity    Alcohol use: Not Currently    Drug use: Yes     Types: Cannabis     Comment: CBD.   Other Topics Concern    Caffeine Concern Yes     Comment: (Soda, Tea)    Exercise No     Comment: PT   Social History Narrative    The patient does not use an assistive device..      The patient does live in a home with stairs.     Social Drivers of Health      Received from [facility]    Social Connections    Received from [facility]    Housing Stability      Past Surgical History:   Procedure Laterality Date    Adenoidectomy      Colonoscopy      Myringotomy, laser-assisted      Other Right 10/26/2020    right forearm compound fracture    Other surgical history      Myringotomy and tube placement    Other surgical history Right     compound fx right forearm     Other surgical history Right     ulnar release    Tonsillectomy           EXAM:   Wt 283 lb (128.4 kg)   BMI 48.58 kg/m²     System Details   Skin Inspection - Normal.   Constitutional Overall appearance - Normal.   Head/Face Symmetric, TMJ tenderness not present    Eyes EOMI, PERRL   Right ear:  Canal clear, TM intact, no MASSIEL   Left ear:  Canal clear, TM with a T-tube in place, blood in the barrel of the ear tube draining.  Retraction inflammation of the eardrum   Nose: Septum midline, inferior turbinates not enlarged, nasal valves without collapse    Oral cavity/Oropharynx: No lesions or masses on inspection or palpation, tonsils symmetric    Neck: Soft without LAD, thyroid not enlarged  Voice clear/ no stridor   Other:       SCOPES AND PROCEDURES:         AUDIOGRAM AND IMAGING:         IMPRESSION:   1. Acute otitis media, unspecified otitis media type    2. Eustachian tube disorder, unspecified laterality    3. Blood in ear canal, left       Recommendations:  -Appears to have an otitis media on the left with drainage through the ear tube.  May have had a rupture of a blood vessel  -Will treat with ofloxacin otic drops as these are likely covered by his insurance.  Also prescribe oral Augmentin  -If not improving in the next 1 to 2 weeks should return.  Discussed dry ear precautions    The patient indicates understanding of these issues and agrees to the plan.      Jorge L Morejon MD  12/5/2024  5:27 PM

## 2025-01-02 NOTE — Clinical Note
Hello, could we fax my note to Dr. Emaline Krabbe (PCP)? Her fax is [phone]. Thanks! Preventive Care Visit  [facility]  Prakash Castellanos MD, Internal Medicine  Jan 2, 2025      Assessment & Plan     (Z00.01) Encounter for routine adult medical exam with abnormal findings  (primary encounter diagnosis)  Comment: Discussed cardiac disease risk factor modification including screening, preventing, and treating hypertension, elevated lipids, diabetes, and smoking cessation.    Discussed age appropriate cancer screening recommendations as dictated by age group and past medical history.    Recommended making better food choices as often as possible, including lower carb, lower fat, lower salt diet and moderation in any alcohol intake.    Recommended maintaining regular physical activity/exercise throughout their lifetime.  Recommended safety and injury prevention (i.e. seatbelt use, safety equipment like helmets when biking, etc).    Reviewed preventive health counseling, as reflected in patient instructions       Regular exercise       Healthy diet/nutrition       Vision screening       Hearing screening       Immunizations  Vaccinated for: Influenza    I strongly recommended updating his COVID booster, he declined that today.         Colorectal cancer screening starting age 45, unless a family history of colon cancer develops.       Prostate cancer screening starting after age 45.  Plan: REVIEW OF HEALTH MAINTENANCE PROTOCOL ORDERS            (R06.83) Snoring  Comment: Sleep study evaluation showed no significant obstructive sleep apnea.  He should still see ENT specialist to evaluate for potentially reversible treatable causes for his snoring.  Plan: REVIEW OF HEALTH MAINTENANCE PROTOCOL ORDERS            (Z13.6) CARDIOVASCULAR SCREENING; LDL GOAL LESS THAN 130  Comment: Discussed cardiac disease risk factors and cardiac disease risk factor modification.   Plan: REVIEW OF HEALTH MAINTENANCE PROTOCOL ORDERS,         Lipid panel reflex to direct LDL Fasting, CBC         with  platelets, Basic metabolic panel            (E66.01,  Z68.35) Severe obesity (BMI 35.0-35.9 with comorbidity) (H)  Comment: Obesity is contributing to hyperlipidemia, acid reflux..  Current BMI is: Body mass index is 36.09 kg/m ..  Reviewed weight patterns.   Obesity as a biological preventable and treatable disease, which is associated with significantly increased risk of many acute and chronic health conditions.   Obesity has now been recognized as a chronic disease by the American Medical Association.  Obesity has serious co-morbidities associated with obesity including increased risk for hypertension, stroke, coronary artery disease, dyslipidemia, Type II diabetes, depression, sleep apnea, cancers of the colon, breast and endometrium, obstructive sleep apnea, osteoarthritis and female infertility.  Recommended regular aerobic exercise, recommended improved diet aiming at lowering amount of processed foods, lower sugars, moderation/reduction of simple carbs and fat in the diet, establishing more regular meal times, always eating breakfast, front loading some of the calories and adding more protein to the diet.     Referral to [facility] Clinic for discussion of more aggressive measures for weight loss can be considered (including medical options (e.g. GLP-1, or appetite suppressants, etc.) or bariatric surgical procedures.   Plan: REVIEW OF HEALTH MAINTENANCE PROTOCOL ORDERS,         Lipid panel reflex to direct LDL Fasting, CBC         with platelets, Basic metabolic panel            (E78.5) Hyperlipidemia LDL goal <130  Comment: Last LDL was elevated, not high enough for medicine.  Repeat LDL cholesterol today.  If the LDL persistently higher than 175 may need to consider statin medicine.  Otherwise continue better diet choices at this time.  Plan: REVIEW OF HEALTH MAINTENANCE PROTOCOL ORDERS,         Lipid panel reflex to direct LDL Fasting, CBC         with platelets, Basic metabolic panel            (K21.9)  Gastroesophageal reflux disease without esophagitis  Comment: Intermittent reflux, discussed functional matters.  Plan: REVIEW OF HEALTH MAINTENANCE PROTOCOL ORDERS,         Lipid panel reflex to direct LDL Fasting, CBC         with platelets, Basic metabolic panel            (Z23) Need for influenza vaccination  Comment:   Plan: REVIEW OF HEALTH MAINTENANCE PROTOCOL ORDERS,         INFLUENZA VACCINE,SPLIT         VIRUS,TRIVALENT,PF(FLUZONE)            (Z23) High priority for COVID-19 vaccination  Comment:   Plan:        Patient has been advised of split billing requirements and indicates understanding: Yes        BMI  Estimated body mass index is 36.09 kg/m  as calculated from the following:    Height as of this encounter: 1.829 m (6').    Weight as of this encounter: 120.7 kg (266 lb 1.6 oz).       Counseling  Appropriate preventive services were addressed with this patient via screening, questionnaire, or discussion as appropriate for fall prevention, nutrition, physical activity, Tobacco-use cessation, social engagement, weight loss and cognition.  Checklist reviewing preventive services available has been given to the patient.  Reviewed patient's diet, addressing concerns and/or questions.   He is at risk for lack of exercise and has been provided with information to increase physical activity for the benefit of his well-being.           Neno Aguirre is a 39 year old, presenting for the following:  Physical        1/2/2025     8:44 AM   Additional Questions   Roomed by Renetta GIRARD CMA        HPI      Health Care Directive  Patient does not have a Health Care Directive: Discussed advance care planning with patient; however, patient declined at this time.      12/30/2024   General Health   How would you rate your overall physical health? Good   Feel stress (tense, anxious, or unable to sleep) Only a little   (!) STRESS CONCERN      12/30/2024   Nutrition   Three or more servings of calcium each day? (!) NO    Diet: Regular (no restrictions)   How many servings of fruit and vegetables per day? (!) 0-1   How many sweetened beverages each day? (!) 2         12/30/2024   Exercise   Days per week of moderate/strenous exercise 1 day   Average minutes spent exercising at this level 10 min   (!) EXERCISE CONCERN      12/30/2024   Social Factors   Frequency of gathering with friends or relatives Once a week   Worry food won't last until get money to buy more No   Food not last or not have enough money for food? No   Do you have housing? (Housing is defined as stable permanent housing and does not include staying ouside in a car, in a tent, in an abandoned building, in an overnight shelter, or couch-surfing.) Yes   Are you worried about losing your housing? No   Lack of transportation? No   Unable to get utilities (heat,electricity)? No         12/30/2024   Dental   Dentist two times every year? Yes         12/30/2024   TB Screening   Were you born outside of the [place]? No                 12/30/2024   Substance Use   Alcohol more than 3/day or more than 7/wk No   Do you use any other substances recreationally? No     Social History     Tobacco Use    Smoking status: Never    Smokeless tobacco: Never   Vaping Use    Vaping status: Never Used   Substance Use Topics    Alcohol use: Yes     Comment: 2-3 per week    Drug use: No           12/30/2024   STI Screening   New sexual partner(s) since last STI/HIV test? No   ASCVD Risk   The 10-year ASCVD risk score (Chaitanya CARROLL, et al., 2019) is: 2.2%    Values used to calculate the score:      Age: 39 years      Sex: Male      Is Non- : No      Diabetic: No      Tobacco smoker: No      Systolic Blood Pressure: 126 mmHg      Is BP treated: No      HDL Cholesterol: 42 mg/dL      Total Cholesterol: 252 mg/dL        12/30/2024   Contraception/Family Planning   Questions about contraception or family planning No        Reviewed and updated as needed this visit by  Provider    Allergies  Meds  Problems    Fam Hx            **I reviewed the information recorded in the patient's EPIC chart (including but not limited to medical history, surgical history, family history, problem list, medication list, and allergy list) and updated the information as indicated based on the patients reported information.         Review of Systems  Constitutional, neuro, ENT, endocrine, pulmonary, cardiac, gastrointestinal, genitourinary, musculoskeletal, integument and psychiatric systems are negative, except as otherwise noted.     Objective    Exam  /84   Pulse 76   Temp 97.6  F (36.4  C) (Tympanic)   Resp 16   Ht 1.829 m (6')   Wt 120.7 kg (266 lb 1.6 oz)   SpO2 99%   BMI 36.09 kg/m     Estimated body mass index is 36.09 kg/m  as calculated from the following:    Height as of this encounter: 1.829 m (6').    Weight as of this encounter: 120.7 kg (266 lb 1.6 oz).    Physical Exam  GENERAL alert and no distress  EYES:  Normal sclera,conjunctiva, EOMI  HENT: oral and posterior pharynx without lesions or erythema, facies symmetric  NECK: Neck supple. No LAD, without thyroidmegaly.  RESP: Clear to ausculation bilaterally without wheezes or crackles. Normal BS in all fields.  CV: RRR normal S1S2 without murmurs, rubs or gallops.  LYMPH: no cervical lymph adenopathy appreciated  MS: extremities- no gross deformities of the visible extremities noted,   EXT:  no lower extremity edema  PSYCH: Alert and oriented times 3; speech- coherent  SKIN:  No obvious significant skin lesions on visible portions of face         Signed Electronically by: Prakash Castellanos MD

## 2025-08-20 ENCOUNTER — OFFICE VISIT (OUTPATIENT)
Facility: LOCATION | Age: 30
End: 2025-08-20

## 2025-08-20 VITALS — WEIGHT: 282 LBS | BODY MASS INDEX: 48 KG/M2

## 2025-08-20 DIAGNOSIS — H92.12 OTORRHEA, LEFT: ICD-10-CM

## 2025-08-20 DIAGNOSIS — H66.90 ACUTE OTITIS MEDIA, UNSPECIFIED OTITIS MEDIA TYPE: Primary | ICD-10-CM

## 2025-08-20 DIAGNOSIS — H69.90 EUSTACHIAN TUBE DISORDER, UNSPECIFIED LATERALITY: ICD-10-CM

## 2025-08-20 RX ORDER — MELOXICAM 15 MG/1
15 TABLET ORAL
COMMUNITY
Start: 2025-08-01 | End: 2025-10-30

## 2025-08-20 RX ORDER — CIPROFLOXACIN AND DEXAMETHASONE 3; 1 MG/ML; MG/ML
4 SUSPENSION/ DROPS AURICULAR (OTIC) EVERY 12 HOURS
Qty: 7.5 ML | Refills: 0 | Status: SHIPPED | OUTPATIENT
Start: 2025-08-20 | End: 2025-08-27

## 2025-08-21 ENCOUNTER — OFFICE VISIT (OUTPATIENT)
Facility: LOCATION | Age: 30
End: 2025-08-21

## 2025-08-21 VITALS — BODY MASS INDEX: 48 KG/M2 | WEIGHT: 282 LBS

## 2025-08-21 DIAGNOSIS — H73.20 MYRINGITIS: ICD-10-CM

## 2025-08-21 DIAGNOSIS — L92.9 GRANULATION TISSUE OF EAR CANAL: ICD-10-CM

## 2025-08-21 DIAGNOSIS — H69.92 CHRONIC DYSFUNCTION OF LEFT EUSTACHIAN TUBE: Primary | ICD-10-CM

## 2025-08-21 PROCEDURE — 92504 EAR MICROSCOPY EXAMINATION: CPT | Performed by: OTOLARYNGOLOGY

## 2025-08-21 PROCEDURE — 99213 OFFICE O/P EST LOW 20 MIN: CPT | Performed by: OTOLARYNGOLOGY

## (undated) DIAGNOSIS — M54.16 RIGHT LUMBAR RADICULITIS: Primary | ICD-10-CM

## (undated) DIAGNOSIS — M54.81 OCCIPITAL NEURALGIA OF RIGHT SIDE: Primary | ICD-10-CM

## (undated) DIAGNOSIS — R29.818 TRANSIENT NEUROLOGICAL SYMPTOMS: Primary | ICD-10-CM

## (undated) DEVICE — UNDYED BRAIDED (POLYGLACTIN 910), SYNTHETIC ABSORBABLE SUTURE: Brand: COATED VICRYL

## (undated) DEVICE — ZIMMER® STERILE DISPOSABLE TOURNIQUET CUFF WITH PLC, DUAL PORT, SINGLE BLADDER, 24 IN. (61 CM)

## (undated) DEVICE — GAMMEX® PI HYBRID SIZE 7.5, STERILE POWDER-FREE SURGICAL GLOVE, POLYISOPRENE AND NEOPRENE BLEND: Brand: GAMMEX

## (undated) DEVICE — TOWEL SURG OR 17X30IN BLUE

## (undated) DEVICE — SUTURE MONOCRYL 3-0 Y936H

## (undated) DEVICE — SUTURE VICRYL 2-0 CT-1

## (undated) DEVICE — GAMMEX® PI HYBRID SIZE 8, STERILE POWDER-FREE SURGICAL GLOVE, POLYISOPRENE AND NEOPRENE BLEND: Brand: GAMMEX

## (undated) DEVICE — SOL  .9 1000ML BTL

## (undated) DEVICE — GAUZE SPONGES,12 PLY: Brand: CURITY

## (undated) DEVICE — SUCTION CANISTER, 3000CC,SAFELINER: Brand: DEROYAL

## (undated) DEVICE — DRAPE SRG 70X60IN SPLT U IMPRV

## (undated) DEVICE — CASED DISP BIPOLAR CORD

## (undated) DEVICE — UPPER EXTREMITY: Brand: MEDLINE INDUSTRIES, INC.

## (undated) DEVICE — SUTURE ETHILON 3-0 669H

## (undated) DEVICE — SUTURE MONOCRYL 4-0 PS-2

## (undated) DEVICE — STERILE TETRA-FLEX CF, ELASTIC BANDAGE, 4" X 5.5YD: Brand: TETRA-FLEX™CF

## (undated) NOTE — LETTER
[address], [facility]   Date:   4/26/2022     Name:   Arabella Underwood. YOB: 1995   MRN:   [id number]       WHERE IS YOUR PAIN NOW? Antonina the areas on your body where you feel the described sensations. Use the appropriate symbol. Iainluke Jesuselbert the areas of radiation. Include all affected areas. Just to complete the picture, please draw in the face. ACHE:  ^ ^ ^   NUMBNESS:  0000   PINS & NEEDLES:  = = = =                              ^ ^ ^                       0000              = = = =                                    ^ ^ ^                       0000            = = = =      BURNING:  XXXX   STABBING: ////                  XXXX                ////                         XXXX          ////     Please antonina the line below indicating your degree of pain right now  with 0 being no pain 10 being the worst pain possible.                                          0             1             2              3             4              5              6              7             8             9             10         Patient Signature:

## (undated) NOTE — LETTER
[address]   Date:   8/12/2021     Name:   Octavio Corral. YOB: 1995   MRN:   [id number]       WHERE IS YOUR PAIN NOW? Edward the areas on your body where you feel the described sensations.   Use the appropr

## (undated) NOTE — LETTER
August 26, 2021    aMrium Dose, DO  [address] [facility]  [address]     Patient: Rachana Duarte    YOB: 1995   Date of Visit: 8/26/2021       Dear Dr. Disha Gutierrez DO:    Thank you for referring Sam Gee to me fo resultant neural compromise at this level or elsewhere throughout the cervical spine. 3. Mild adenoid enlargement noted. 4. Image degradation secondary to patient related motion artifact.      Pt updated glasses his March 2021 and denies any blurred vis • tiZANidine 2 MG Oral Tab Take 0.5 tablets (1 mg total) by mouth nightly. 15 tablet 0   • ibuprofen (ADVIL) 200 MG Oral Tab Take 600 mg by mouth every 6 (six) hours as needed for Pain.      • ofloxacin 0.3 % Otic Solution Place 3 drops into the left ear Iris Normal Normal    Lens Clear Clear    Vitreous Clear Clear          Fundus Exam       Right Left    Disc Good rim, Temporal crescent- no optic disc swelling  Good rim, Temporal crescent- no optic disc swelling     C/D Ratio 0.1 0.1    Macula Normal

## (undated) NOTE — LETTER
AUTHORIZATION FOR SURGICAL OPERATION OR OTHER PROCEDURE    1.  I hereby authorize Dr. Silva Villalobos and the [facility] staff assigned to my case to perform the following operation and/or procedure at the [facility]:    _______Occipital Nerve Block______________ Name:  ________William Gerlean Locus Jr.______________________________________________  (please print)       Patient signature:  ___________________________________________________             Relationship to Patient:           []  Parent    Responsible perso

## (undated) NOTE — LETTER
[address] , [facility]   Date:   9/16/2021     Name:   Florencio Pandya. YOB: 1995   MRN:   [id number]       WHERE IS YOUR PAIN NOW?   Edward the areas on your body where you feel the

## (undated) NOTE — LETTER
[address] , [facility][1]   Date:   11/4/2021     Name:   iLssette Grey. YOB: 1995   MRN:   [id number]       WHERE IS YOUR PAIN NOW?   Edward the areas on your body where you feel the

## (undated) NOTE — LETTER
No referring provider defined for this encounter. 11/19/21        Patient: Raji Saxena.    YOB: 1995   Date of Visit: 11/18/2021       Dear  Dr. Dionisio Wyatt MD,      Thank you for referring Raji Saxena. to my practice [id number]  [facility] [id number]    Document electronically generated by:  Meena Manley.  Hannah Choe MD

## (undated) NOTE — LETTER
AUTHORIZATION FOR SURGICAL OPERATION OR OTHER PROCEDURE    1. I hereby authorize Dr. Ulisses Daniel and the [facility] staff assigned to my case to perform the following operation and/or procedure at the [facility]:    Right cervical paraspinal, upper trapezius trigger point injection     2. My physician has explained the nature and purpose of the operation or other procedure, possible alternative methods of treatment, the risks involved, and the possibility of complication to me. I acknowledge that no guarantee has been made as to the result that may be obtained. 3.  I recognize that, during the course of this operation, or other procedure, unforseen conditions may necessitate additional or different procedure than those listed above. I, therefore, further authorize and request that the above named physician, his/her physician assistants or designees perform such procedures as are, in his/her professional opinion, necessary and desirable. 4.  Any tissue or organs removed in the operation or other procedure may be disposed of by and at the discretion of the [facility] staff and [facility]. 5.  I understand that in the event of a medical emergency, I will be transported by local paramedics to [facility] or other hospital emergency department. 6.  I certify that I have read and fully understand the above consent to operation and/or other procedure. 7.  I acknowledge that my physician has explained sedation/analgesia administration to me including the risks and benefits. I consent to the administration of sedation/analgesia as may be necessary or desirable in the judgement of my physician. Witness signature: ___________________________________________________ Date:  ______/______/_____                    Time:  ________ A. M.  P.M.        Patient Name:  Nimo Reynoso  [birth year]  [id number]         Patient signature: ___________________________________________________                 Statement of Physician  My signature below affirms that prior to the time of the procedure, I have explained to the patient and/or his/her guardian, the risks and benefits involved in the proposed treatment and any reasonable alternative to the proposed treatment. I have also explained the risks and benefits involved in the refusal of the proposed treatment and have answered the patient's questions.                         Date:  ______/______/_______  Provider                      Signature:  __________________________________________________________       Time:  ___________ ARAMIREZ THOMPSON

## (undated) NOTE — LETTER
[address] , [facility]   Date:   2/24/2022     Name:   Anthony Elliott. YOB: 1995   MRN:   [id number]       WHERE IS YOUR PAIN NOW? Edward the areas on your body where you feel the described sensations. Use the appropriate symbol. Duran Shearerns the areas of radiation. Include all affected areas. Just to complete the picture, please draw in the face. ACHE:  ^ ^ ^   NUMBNESS:  0000   PINS & NEEDLES:  = = = =                              ^ ^ ^                       0000              = = = =                                    ^ ^ ^                       0000            = = = =      BURNING:  XXXX   STABBING: ////                  XXXX                ////                         XXXX          ////     Please edward the line below indicating your degree of pain right now  with 0 being no pain 10 being the worst pain possible.                                          0             1             2              3             4              5              6              7             8             9             10         Patient Signature:

## (undated) NOTE — LETTER
No referring provider defined for this encounter. 11/19/21        Patient: Boo Abernathy.    YOB: 1995   Date of Visit: 11/18/2021       Dear  Dr. Haylie Martinez, DO,      Thank you for referring Boo Abernathy. to my pract [address]  [facility] [id number]    Document electronically generated by:  Fredrick Galicia.  Umair Boyd MD

## (undated) NOTE — LETTER
[address]   Date:   8/18/2021     Name:   Vargas Hayes. YOB: 1995   MRN:   [id number]       WHERE IS YOUR PAIN NOW? Edward the areas on your body where you feel the described sensations.   Use the appropr

## (undated) NOTE — MR AVS SNAPSHOT
[address] [facility]  [phone]               Thank you for choosing us for your health care visit with Virginie Emmanuel MD.  We are glad to serve you and happy to provide you with this summar - Ciprofloxacin HCl 500 MG Tabs  - ofloxacin 0.3 % Soln            [employer]     Sign up for [facility] [facility], your secure online medical record. zhouwu will allow you to access patient instructions from your recent visit,  view other health information, and more. Get your heart pumping – brisk walking, biking, swimming Even 10 minute increments are effective and add up over the week   2 ½ hours per week – spread out over time Use a elijah to keep you motivated   Don’t forget strength training with weights and resist

## (undated) NOTE — LETTER
AUTHORIZATION FOR SURGICAL OPERATION OR OTHER PROCEDURE    1.  I hereby authorize Dr. Curtis Fregoso and the [facility] staff assigned to my case to perform the following operation and/or procedure at the [facility]:    Right cervical paraspinal and upper trapeziu ___________________________________________________  Statement of Physician  My signature below affirms that prior to the time of the procedure, I have explained to the patient and/or his/her guardian, the risks and benefits involved in the proposed treatm

## (undated) NOTE — LETTER
No referring provider defined for this encounter. 11/19/21        Patient: Meera Fowler.    YOB: 1995   Date of Visit: 11/18/2021       Dear  Dr. Alyce Roberts MD,      Thank you for referring Meera Fowler. to my practi [address]  [id number]  [facility] [id number]    Document electronically generated by:  Rogerio Barba.  Geri Naik MD